# Patient Record
Sex: MALE | Race: WHITE | URBAN - METROPOLITAN AREA
[De-identification: names, ages, dates, MRNs, and addresses within clinical notes are randomized per-mention and may not be internally consistent; named-entity substitution may affect disease eponyms.]

---

## 2018-12-19 RX ORDER — LISINOPRIL 20 MG/1
20 TABLET ORAL DAILY
COMMUNITY

## 2018-12-26 ENCOUNTER — ANESTHESIA EVENT (OUTPATIENT)
Dept: OPERATING ROOM | Age: 78
End: 2018-12-26
Payer: MEDICARE

## 2018-12-26 NOTE — H&P
History and Physical      CHIEF COMPLAINT:  R hand numb    HISTORY OF PRESENT ILLNESS:          Past Medical History:        Diagnosis Date    CAD (coronary artery disease)     Hypertension      Past Surgical History:        Procedure Laterality Date    CAROTID STENT PLACEMENT  2013    CORONARY ARTERY BYPASS GRAFT  2014    x6 bypass dr. Wayne Heading      cyst, benign    PACEMAKER PLACEMENT  08/2018    [facility]     Social History:    TOBACCO:   reports that he has never smoked. He has never used smokeless tobacco.  ETOH:   has no alcohol history on file. DRUGS:   has no drug history on file. Family History:   History reviewed. No pertinent family history. Medications Prior to Admission:  No prescriptions prior to admission. Allergies:  Patient has no known allergies. CONSTITUTIONAL:  negative for  chills and anorexia  HEENT:  negative for  tinnitus  RESPIRATORY:  negative for  dyspnea and cyanosis  CARDIOVASCULAR:  negative for  dyspnea, syncope  GASTROINTESTINAL:  negative for vomiting and odynophagia  GENITOURINARY:  negative for hematuria  ENDOCRINE:  negative for tremor  MUSCULOSKELETAL:  negative for  joint swelling and bone pain  NEUROLOGICAL:  negative for seizures and syncope  BEHAVIOR/PSYCH:  negative for agitated and anxiety    PHYSICAL EXAM:  General appearance:  awake, alert, cooperative, no apparent distress, and appears stated age  Neurologic:  Awake, alert, oriented to name, place and time. Cranial nerves II-XII are grossly intact. Motor is 5 out of 5 bilaterally. Cerebellar finger to nose, heel to shin intact. Sensory is intact.   Babinski down going, Romberg negative, and gait is normal.  Lungs:  No increased work of breathing, good air exchange, clear to auscultation bilaterally, no crackles or wheezing  Heart:  pulses 2 plus all extermities bilaterally  Abdomen:  normal bowel sounds  Skin: warm and dry, no rash or erythema  ENT: tympanic membrane, external ear and ear

## 2018-12-27 ENCOUNTER — HOSPITAL ENCOUNTER (OUTPATIENT)
Age: 78
Setting detail: OUTPATIENT SURGERY
Discharge: HOME | End: 2018-12-27
Attending: ORTHOPAEDIC SURGERY | Admitting: ORTHOPAEDIC SURGERY
Payer: MEDICARE

## 2018-12-27 ENCOUNTER — ANESTHESIA (OUTPATIENT)
Dept: OPERATING ROOM | Age: 78
End: 2018-12-27
Payer: MEDICARE

## 2018-12-27 VITALS
OXYGEN SATURATION: 97 % | WEIGHT: 208 LBS | BODY MASS INDEX: 29.12 KG/M2 | RESPIRATION RATE: 14 BRPM | SYSTOLIC BLOOD PRESSURE: 110 MMHG | TEMPERATURE: 97 F | HEART RATE: 62 BPM | HEIGHT: 71 IN | DIASTOLIC BLOOD PRESSURE: 88 MMHG

## 2018-12-27 VITALS
SYSTOLIC BLOOD PRESSURE: 114 MMHG | DIASTOLIC BLOOD PRESSURE: 75 MMHG | OXYGEN SATURATION: 97 % | RESPIRATION RATE: 17 BRPM

## 2018-12-27 DIAGNOSIS — G56.01 CARPAL TUNNEL SYNDROME OF RIGHT WRIST: Primary | ICD-10-CM

## 2018-12-27 PROCEDURE — 3700000001 HC ADD 15 MINUTES (ANESTHESIA): Performed by: ORTHOPAEDIC SURGERY

## 2018-12-27 PROCEDURE — 3700000000 HC ANESTHESIA ATTENDED CARE: Performed by: ORTHOPAEDIC SURGERY

## 2018-12-27 PROCEDURE — 3600000012 HC SURGERY LEVEL 2 ADDTL 15MIN: Performed by: ORTHOPAEDIC SURGERY

## 2018-12-27 PROCEDURE — 3600000002 HC SURGERY LEVEL 2 BASE: Performed by: ORTHOPAEDIC SURGERY

## 2018-12-27 PROCEDURE — 6360000002 HC RX W HCPCS: Performed by: NURSE ANESTHETIST, CERTIFIED REGISTERED

## 2018-12-27 PROCEDURE — 2709999900 HC NON-CHARGEABLE SUPPLY: Performed by: ORTHOPAEDIC SURGERY

## 2018-12-27 PROCEDURE — 2580000003 HC RX 258: Performed by: ANESTHESIOLOGY

## 2018-12-27 PROCEDURE — 2500000003 HC RX 250 WO HCPCS: Performed by: NURSE ANESTHETIST, CERTIFIED REGISTERED

## 2018-12-27 PROCEDURE — 7100000010 HC PHASE II RECOVERY - FIRST 15 MIN: Performed by: ORTHOPAEDIC SURGERY

## 2018-12-27 PROCEDURE — 7100000011 HC PHASE II RECOVERY - ADDTL 15 MIN: Performed by: ORTHOPAEDIC SURGERY

## 2018-12-27 RX ORDER — HYDRALAZINE HYDROCHLORIDE 20 MG/ML
5 INJECTION INTRAMUSCULAR; INTRAVENOUS EVERY 10 MIN PRN
Status: DISCONTINUED | OUTPATIENT
Start: 2018-12-27 | End: 2018-12-27 | Stop reason: HOSPADM

## 2018-12-27 RX ORDER — LABETALOL HYDROCHLORIDE 5 MG/ML
5 INJECTION, SOLUTION INTRAVENOUS EVERY 10 MIN PRN
Status: DISCONTINUED | OUTPATIENT
Start: 2018-12-27 | End: 2018-12-27 | Stop reason: HOSPADM

## 2018-12-27 RX ORDER — HYDROCODONE BITARTRATE AND ACETAMINOPHEN 5; 325 MG/1; MG/1
1 TABLET ORAL PRN
Status: DISCONTINUED | OUTPATIENT
Start: 2018-12-27 | End: 2018-12-27 | Stop reason: HOSPADM

## 2018-12-27 RX ORDER — PROPOFOL 10 MG/ML
INJECTION, EMULSION INTRAVENOUS PRN
Status: DISCONTINUED | OUTPATIENT
Start: 2018-12-27 | End: 2018-12-27 | Stop reason: SDUPTHER

## 2018-12-27 RX ORDER — ACETAMINOPHEN AND CODEINE PHOSPHATE 300; 30 MG/1; MG/1
1 TABLET ORAL EVERY 4 HOURS PRN
Qty: 30 TABLET | Refills: 0 | Status: SHIPPED | OUTPATIENT
Start: 2018-12-27 | End: 2019-01-06

## 2018-12-27 RX ORDER — FENTANYL CITRATE 50 UG/ML
50 INJECTION, SOLUTION INTRAMUSCULAR; INTRAVENOUS EVERY 5 MIN PRN
Status: DISCONTINUED | OUTPATIENT
Start: 2018-12-27 | End: 2018-12-27 | Stop reason: HOSPADM

## 2018-12-27 RX ORDER — FENTANYL CITRATE 50 UG/ML
INJECTION, SOLUTION INTRAMUSCULAR; INTRAVENOUS PRN
Status: DISCONTINUED | OUTPATIENT
Start: 2018-12-27 | End: 2018-12-27 | Stop reason: SDUPTHER

## 2018-12-27 RX ORDER — LIDOCAINE HYDROCHLORIDE 5 MG/ML
INJECTION, SOLUTION INFILTRATION; INTRAVENOUS PRN
Status: DISCONTINUED | OUTPATIENT
Start: 2018-12-27 | End: 2018-12-27 | Stop reason: SDUPTHER

## 2018-12-27 RX ORDER — DIPHENHYDRAMINE HYDROCHLORIDE 50 MG/ML
12.5 INJECTION INTRAMUSCULAR; INTRAVENOUS
Status: DISCONTINUED | OUTPATIENT
Start: 2018-12-27 | End: 2018-12-27 | Stop reason: HOSPADM

## 2018-12-27 RX ORDER — TRAMADOL HYDROCHLORIDE 50 MG/1
50 TABLET ORAL EVERY 6 HOURS PRN
Status: DISCONTINUED | OUTPATIENT
Start: 2018-12-27 | End: 2018-12-27 | Stop reason: HOSPADM

## 2018-12-27 RX ORDER — SODIUM CHLORIDE, SODIUM LACTATE, POTASSIUM CHLORIDE, CALCIUM CHLORIDE 600; 310; 30; 20 MG/100ML; MG/100ML; MG/100ML; MG/100ML
INJECTION, SOLUTION INTRAVENOUS CONTINUOUS
Status: DISCONTINUED | OUTPATIENT
Start: 2018-12-27 | End: 2018-12-27 | Stop reason: HOSPADM

## 2018-12-27 RX ORDER — SODIUM CHLORIDE 9 MG/ML
INJECTION, SOLUTION INTRAVENOUS CONTINUOUS PRN
Status: DISCONTINUED | OUTPATIENT
Start: 2018-12-27 | End: 2018-12-27 | Stop reason: SDUPTHER

## 2018-12-27 RX ORDER — PROMETHAZINE HYDROCHLORIDE 25 MG/ML
25 INJECTION, SOLUTION INTRAMUSCULAR; INTRAVENOUS
Status: DISCONTINUED | OUTPATIENT
Start: 2018-12-27 | End: 2018-12-27 | Stop reason: HOSPADM

## 2018-12-27 RX ORDER — MIDAZOLAM HYDROCHLORIDE 1 MG/ML
INJECTION INTRAMUSCULAR; INTRAVENOUS PRN
Status: DISCONTINUED | OUTPATIENT
Start: 2018-12-27 | End: 2018-12-27 | Stop reason: SDUPTHER

## 2018-12-27 RX ORDER — PROPOFOL 10 MG/ML
INJECTION, EMULSION INTRAVENOUS CONTINUOUS PRN
Status: DISCONTINUED | OUTPATIENT
Start: 2018-12-27 | End: 2018-12-27 | Stop reason: SDUPTHER

## 2018-12-27 RX ORDER — MORPHINE SULFATE 2 MG/ML
1 INJECTION, SOLUTION INTRAMUSCULAR; INTRAVENOUS EVERY 5 MIN PRN
Status: DISCONTINUED | OUTPATIENT
Start: 2018-12-27 | End: 2018-12-27 | Stop reason: HOSPADM

## 2018-12-27 RX ORDER — HYDROCODONE BITARTRATE AND ACETAMINOPHEN 5; 325 MG/1; MG/1
2 TABLET ORAL PRN
Status: DISCONTINUED | OUTPATIENT
Start: 2018-12-27 | End: 2018-12-27 | Stop reason: HOSPADM

## 2018-12-27 RX ORDER — MEPERIDINE HYDROCHLORIDE 25 MG/ML
12.5 INJECTION INTRAMUSCULAR; INTRAVENOUS; SUBCUTANEOUS EVERY 5 MIN PRN
Status: DISCONTINUED | OUTPATIENT
Start: 2018-12-27 | End: 2018-12-27 | Stop reason: HOSPADM

## 2018-12-27 RX ADMIN — MIDAZOLAM 1 MG: 1 INJECTION INTRAMUSCULAR; INTRAVENOUS at 12:09

## 2018-12-27 RX ADMIN — SODIUM CHLORIDE, POTASSIUM CHLORIDE, SODIUM LACTATE AND CALCIUM CHLORIDE: 600; 310; 30; 20 INJECTION, SOLUTION INTRAVENOUS at 11:45

## 2018-12-27 RX ADMIN — FENTANYL CITRATE 50 MCG: 50 INJECTION, SOLUTION INTRAMUSCULAR; INTRAVENOUS at 12:09

## 2018-12-27 RX ADMIN — SODIUM CHLORIDE: 9 INJECTION, SOLUTION INTRAVENOUS at 12:03

## 2018-12-27 RX ADMIN — PROPOFOL 50 MG: 10 INJECTION, EMULSION INTRAVENOUS at 12:09

## 2018-12-27 RX ADMIN — LIDOCAINE HYDROCHLORIDE 50 ML: 5 INJECTION, SOLUTION INFILTRATION; INTRAVENOUS at 12:03

## 2018-12-27 RX ADMIN — PROPOFOL 100 MCG/KG/MIN: 10 INJECTION, EMULSION INTRAVENOUS at 12:09

## 2018-12-27 RX ADMIN — PROPOFOL 50 MG: 10 INJECTION, EMULSION INTRAVENOUS at 12:12

## 2018-12-27 ASSESSMENT — PULMONARY FUNCTION TESTS
PIF_VALUE: 0

## 2018-12-27 ASSESSMENT — PAIN SCALES - GENERAL
PAINLEVEL_OUTOF10: 0
PAINLEVEL_OUTOF10: 0

## 2018-12-27 ASSESSMENT — PAIN - FUNCTIONAL ASSESSMENT: PAIN_FUNCTIONAL_ASSESSMENT: 0-10

## 2018-12-27 ASSESSMENT — PAIN DESCRIPTION - DESCRIPTORS: DESCRIPTORS: NUMBNESS

## 2018-12-27 ASSESSMENT — LIFESTYLE VARIABLES: SMOKING_STATUS: 0

## 2019-03-11 ENCOUNTER — OFFICE VISIT (OUTPATIENT)
Dept: ORTHOPEDIC SURGERY | Age: 79
End: 2019-03-11

## 2019-03-11 VITALS — WEIGHT: 210 LBS | HEIGHT: 72 IN | BODY MASS INDEX: 28.44 KG/M2

## 2019-03-11 DIAGNOSIS — Z98.890 S/P CARPAL TUNNEL RELEASE: Primary | ICD-10-CM

## 2019-03-11 PROCEDURE — 99024 POSTOP FOLLOW-UP VISIT: CPT | Performed by: ORTHOPAEDIC SURGERY

## 2019-04-01 ENCOUNTER — OFFICE VISIT (OUTPATIENT)
Dept: ORTHOPEDIC SURGERY | Age: 79
End: 2019-04-01
Payer: MEDICARE

## 2019-04-01 VITALS — WEIGHT: 210 LBS | BODY MASS INDEX: 29.4 KG/M2 | HEIGHT: 71 IN

## 2019-04-01 DIAGNOSIS — G56.01 CARPAL TUNNEL SYNDROME OF RIGHT WRIST: Primary | ICD-10-CM

## 2019-04-01 PROCEDURE — 99212 OFFICE O/P EST SF 10 MIN: CPT | Performed by: ORTHOPAEDIC SURGERY

## 2019-04-01 RX ORDER — PRAVASTATIN SODIUM 40 MG
TABLET ORAL
COMMUNITY
Start: 2019-03-28

## 2019-04-01 NOTE — PROGRESS NOTES
Chief Complaint:   Chief Complaint   Patient presents with    Follow-up     Follow up from right CTS DOS 12/27/18. Patient is having numbness in all his fingers. He does not do well with the cold weather. Caron Kuhn  is in for follow-up of his right carpal tunnel decompression. This was done 12/27 2018. He has noticed a great deal of change in the right hand over the longer term here. He still has some tingling in his distal palm and not a lot of change in the sensation in the fingers. The left hand is not as bad as the right one but he does have some tingling and some numbness in that. He does recall the results of the electrodiagnostic testing which showed that he had no response in her right hand but still had response in the left      Allergies; medications; past medical, surgical, family, and social history; and problem list have been reviewed today and updated as indicated in this encounter seen below. Exam: His skin looks good and his scar is well-healed. He has very little evidence of his surgery in the right hand. He does have a slightly more thenar atrophy in the right hand than in the left. He also has some hypertrophic changes in the thumb MP and CMC joints from degeneration. He has good functional range of motion in all of his fingers. ASSESSMENT:    Lindsey Forbes was seen today for follow-up. Diagnoses and all orders for this visit:    Carpal tunnel syndrome of right wrist    I discussed the significance of the numbers and results of the electrodiagnostic testing. We're hopeful that some tingling in his distal palm is an indication that his right median nerve is recovering even though it may be slow. Overall the prognosis for recovery of his left hand if he decides to have that one decompress should be better as indicated by the electrodiagnostic results. PLAN: Follow up as needed.  If the left hand gets worse or he wants to try to prevent it from becoming as bad as the right we could decompress his left median nerve    No follow-ups on file. Current Outpatient Medications   Medication Sig Dispense Refill    pravastatin (PRAVACHOL) 40 MG tablet       metoprolol tartrate (LOPRESSOR) 25 MG tablet Take 25 mg by mouth 2 times daily      lisinopril (PRINIVIL;ZESTRIL) 20 MG tablet Take 20 mg by mouth daily      aspirin 81 MG tablet Take 81 mg by mouth daily       No current facility-administered medications for this visit.         Patient Active Problem List   Diagnosis    Carpal tunnel syndrome of right wrist       Past Medical History:   Diagnosis Date    CAD (coronary artery disease)     Hypertension        Past Surgical History:   Procedure Laterality Date    CAROTID STENT PLACEMENT  2013    CARPAL TUNNEL RELEASE Right 12/27/2018    CARPAL TUNNEL RELEASE Right 12/27/2018    RELEASE TRANSVERSE CARPAL LIGAMENT RIGHT/MEDIAN NERVE EPINEUROTOMY performed by David Phillip DO at [place] [address] GRAFT  2014    x6 bypass dr. Nay Peacock      cyst, benign    PACEMAKER PLACEMENT  08/2018    [facility]       No Known Allergies    Social History     Socioeconomic History    Marital status:      Spouse name: None    Number of children: None    Years of education: None    Highest education level: None   Occupational History    None   Social Needs    Financial resource strain: None    Food insecurity:     Worry: None     Inability: None    Transportation needs:     Medical: None     Non-medical: None   Tobacco Use    Smoking status: Never Smoker    Smokeless tobacco: Never Used   Substance and Sexual Activity    Alcohol use: Yes     Comment: beer    Drug use: Never    Sexual activity: Not Currently   Lifestyle    Physical activity:     Days per week: None     Minutes per session: None    Stress: None   Relationships    Social connections:     Talks on phone: None     Gets together: None     Attends [employer] service: None     Active member of club or organization: None     Attends meetings of clubs or organizations: None     Relationship status: None    Intimate partner violence:     Fear of current or ex partner: None     Emotionally abused: None     Physically abused: None     Forced sexual activity: None   Other Topics Concern    None   Social History Narrative    None       Review of Systems  As follows except as previously noted in HPI:  Constitutional: Negative for chills, diaphoresis, fatigue, fever and unexpected weight change. Respiratory: Negative for cough, shortness of breath and wheezing. Cardiovascular: Negative for chest pain and palpitations. Neurological: Negative for dizziness, syncope, cephalgia. GI / : negative  Musculoskeletal: see HPI       Objective:   Physical Exam   Constitutional: Oriented to person, place, and time. and appears well-developed and well-nourished. :   Head: Normocephalic and atraumatic. Eyes: EOM are normal.   Neck: Neck supple. Cardiovascular: Normal rate and regular rhythm. Pulmonary/Chest: Effort normal. No stridor. No respiratory distress, no wheezes. Abdominal:  No abnormal distension. Neurological: Alert and oriented to person, place, and time. Skin: Skin is warm and dry. Psychiatric: Normal mood and affect.  Behavior is normal. Thought content normal.    ALTHEA Brooks DO    4/1/19  10:14 AM

## 2019-06-24 ENCOUNTER — OFFICE VISIT (OUTPATIENT)
Dept: ORTHOPEDIC SURGERY | Age: 79
End: 2019-06-24
Payer: MEDICARE

## 2019-06-24 VITALS — WEIGHT: 210 LBS | BODY MASS INDEX: 29.4 KG/M2 | HEIGHT: 71 IN

## 2019-06-24 DIAGNOSIS — M25.512 CHRONIC LEFT SHOULDER PAIN: Primary | ICD-10-CM

## 2019-06-24 DIAGNOSIS — G89.29 CHRONIC LEFT SHOULDER PAIN: Primary | ICD-10-CM

## 2019-06-24 PROCEDURE — 99213 OFFICE O/P EST LOW 20 MIN: CPT | Performed by: ORTHOPAEDIC SURGERY

## 2019-06-24 NOTE — PROGRESS NOTES
Harriet Turner is a 66 y.o. male, who presents   Chief Complaint   Patient presents with    Pain     existing patient new problem of left shoulder pain, he has had this pain for months, he has tried ice, he denies any PT, injections or films. HPI[de-identified] Left shoulder pain is been present for some time. It was present prior to doing an excavation job around his foundation for drainage problems. Indicates the pain in the back of the shoulder around the shoulder blade area. There is been no history of specific injury to it. He has continued to be active and seems that his primary goal was relief in his simple fashion as possible. There is no history of numbness in the left shoulder. He is left-hand-dominant. Allergies; medications; past medical, surgical, family, and social history; and problem list have been reviewed today and updated as indicated in this encounter - see below following Ortho specifics. Musculoskeletal: Skin condition gross neurovascular function is good in both upper extremities. There were compared for motion and stability. The left arm towards the shoulder has less mass than the nondominant right arm. There is minimal tenderness to palpation in the anterior shoulder area. There is only slight discomfort around the scapula around the scapular spine. The right shoulder has normal muscle mass motion and function. On the left there is no scapular winging. Rotator cuff strength seems fair to good. Effort was little questionable during physical examination and strength testing. There is little decrease in range of motion of the left shoulder particularly in elevation and external rotation. There was distinct crepitus in the subacromial arch area. This was palpable and audible. There was no gross acromioclavicular or glenohumeral instability. Radiologic Studies: Imaging and 2 views of the left shoulder showed narrowing of the acromioclavicular joint.   The subacromial space was somewhat narrowed with a slightly high riding humeral head within the glenoid. There was some irregularity of the humeral head indicating degenerative arthrosis in the glenohumeral joint. There is also a fairly large anterior inferior acromial osteophyte. ASSESSMENT:  Carlos Humphries was seen today for pain. Diagnoses and all orders for this visit:    Chronic left shoulder pain  -     XR SHOULDER LEFT (MIN 2 VIEWS); Future     Treatment alternatives were reviewed including medical and physical therapies, injections, and surgical options, expected risks benefits and likely outcome of each were discussed in detail, questions asked and answered and understood. Treatment options were discussed including oral anti-inflammatory medications, physical therapy, and injection. I told him I would not prescribe narcotic analgesics for him. PLAN:  also keep it simple with over-the-counter oral anti-inflammatories in the form of ibuprofen. If he continues to have problems or worsens further evaluation would be indicated. There may be some difficulty with this and that he has a pacemaker in the left anterior shoulder area.         Patient Active Problem List   Diagnosis    Carpal tunnel syndrome of right wrist       Past Medical History:   Diagnosis Date    CAD (coronary artery disease)     Hypertension        Past Surgical History:   Procedure Laterality Date    CAROTID STENT PLACEMENT  2013    CARPAL TUNNEL RELEASE Right 12/27/2018    CARPAL TUNNEL RELEASE Right 12/27/2018    RELEASE TRANSVERSE CARPAL LIGAMENT RIGHT/MEDIAN NERVE EPINEUROTOMY performed by Kaye Medina DO at [address]  2014    x6 bypass dr. Rebeka Lewis      cyst, benign    PACEMAKER PLACEMENT  08/2018    [facility]       Current Outpatient Medications   Medication Sig Dispense Refill    pravastatin (PRAVACHOL) 40 MG tablet       metoprolol tartrate (LOPRESSOR) 25 MG tablet Take 25 mg by mouth 2 well-developed and well-nourished. :   Head: Normocephalic and atraumatic. Neck: Neck supple. Eyes: EOM are normal.   Pulmonary/Chest: Effort normal.  No respiratory distress, no wheezes. Neurological: Alert and oriented to person  Skin: Skin is warm and dry. Mert Zavaleta DO    6/24/19  11:34 AM    All reasonable efforts have been made to minimize the risk of errors that may occur in the use of voice recognition and other electronic means of charting.

## 2019-10-16 ENCOUNTER — OFFICE VISIT (OUTPATIENT)
Dept: ORTHOPEDIC SURGERY | Age: 79
End: 2019-10-16
Payer: MEDICARE

## 2019-10-16 VITALS — HEIGHT: 71 IN | BODY MASS INDEX: 28.7 KG/M2 | WEIGHT: 205 LBS

## 2019-10-16 DIAGNOSIS — S46.911A STRAIN OF RIGHT SHOULDER, INITIAL ENCOUNTER: ICD-10-CM

## 2019-10-16 DIAGNOSIS — M19.011 ARTHRITIS OF RIGHT ACROMIOCLAVICULAR JOINT: ICD-10-CM

## 2019-10-16 DIAGNOSIS — M19.011 ARTHRITIS OF RIGHT SHOULDER REGION: ICD-10-CM

## 2019-10-16 DIAGNOSIS — M25.511 ACUTE PAIN OF RIGHT SHOULDER: Primary | ICD-10-CM

## 2019-10-16 PROCEDURE — 99213 OFFICE O/P EST LOW 20 MIN: CPT | Performed by: ORTHOPAEDIC SURGERY

## 2024-01-01 ENCOUNTER — ANESTHESIA (OUTPATIENT)
Dept: OPERATING ROOM | Age: 84
DRG: 421 | End: 2024-01-01
Payer: MEDICARE

## 2024-01-01 ENCOUNTER — APPOINTMENT (OUTPATIENT)
Dept: GENERAL RADIOLOGY | Age: 84
DRG: 421 | End: 2024-01-01
Attending: INTERNAL MEDICINE
Payer: MEDICARE

## 2024-01-01 ENCOUNTER — APPOINTMENT (OUTPATIENT)
Dept: CT IMAGING | Age: 84
DRG: 421 | End: 2024-01-01
Attending: INTERNAL MEDICINE
Payer: MEDICARE

## 2024-01-01 ENCOUNTER — HOSPITAL ENCOUNTER (INPATIENT)
Age: 84
LOS: 8 days | DRG: 421 | End: 2024-12-24
Attending: INTERNAL MEDICINE | Admitting: INTERNAL MEDICINE
Payer: MEDICARE

## 2024-01-01 ENCOUNTER — ANESTHESIA EVENT (OUTPATIENT)
Dept: OPERATING ROOM | Age: 84
DRG: 421 | End: 2024-01-01
Payer: MEDICARE

## 2024-01-01 VITALS — WEIGHT: 160 LBS | TEMPERATURE: 97.9 F | BODY MASS INDEX: 22.4 KG/M2 | HEIGHT: 71 IN

## 2024-01-01 DIAGNOSIS — K86.89 PANCREATIC MASS: ICD-10-CM

## 2024-01-01 DIAGNOSIS — K80.20 SYMPTOMATIC CHOLELITHIASIS: Primary | ICD-10-CM

## 2024-01-01 LAB
ALBUMIN SERPL-MCNC: 2.4 G/DL (ref 3.5–5.2)
ALBUMIN SERPL-MCNC: 2.5 G/DL (ref 3.5–5.2)
ALBUMIN SERPL-MCNC: 2.5 G/DL (ref 3.5–5.2)
ALBUMIN SERPL-MCNC: 2.6 G/DL (ref 3.5–5.2)
ALBUMIN SERPL-MCNC: 2.6 G/DL (ref 3.5–5.2)
ALP SERPL-CCNC: 298 U/L (ref 40–129)
ALP SERPL-CCNC: 299 U/L (ref 40–129)
ALP SERPL-CCNC: 312 U/L (ref 40–129)
ALP SERPL-CCNC: 312 U/L (ref 40–129)
ALP SERPL-CCNC: 314 U/L (ref 40–129)
ALP SERPL-CCNC: 323 U/L (ref 40–129)
ALP SERPL-CCNC: 330 U/L (ref 40–129)
ALP SERPL-CCNC: 355 U/L (ref 40–129)
ALT SERPL-CCNC: 37 U/L (ref 0–40)
ALT SERPL-CCNC: 39 U/L (ref 0–40)
ALT SERPL-CCNC: 41 U/L (ref 0–40)
ALT SERPL-CCNC: 46 U/L (ref 0–40)
ALT SERPL-CCNC: 46 U/L (ref 0–40)
ALT SERPL-CCNC: 47 U/L (ref 0–40)
ALT SERPL-CCNC: 48 U/L (ref 0–40)
ALT SERPL-CCNC: 56 U/L (ref 0–40)
ANION GAP SERPL CALCULATED.3IONS-SCNC: 10 MMOL/L (ref 7–16)
ANION GAP SERPL CALCULATED.3IONS-SCNC: 10 MMOL/L (ref 7–16)
ANION GAP SERPL CALCULATED.3IONS-SCNC: 11 MMOL/L (ref 7–16)
ANION GAP SERPL CALCULATED.3IONS-SCNC: 11 MMOL/L (ref 7–16)
ANION GAP SERPL CALCULATED.3IONS-SCNC: 12 MMOL/L (ref 7–16)
ANION GAP SERPL CALCULATED.3IONS-SCNC: 12 MMOL/L (ref 7–16)
ANION GAP SERPL CALCULATED.3IONS-SCNC: 9 MMOL/L (ref 7–16)
ANION GAP SERPL CALCULATED.3IONS-SCNC: 9 MMOL/L (ref 7–16)
AST SERPL-CCNC: 54 U/L (ref 0–39)
AST SERPL-CCNC: 57 U/L (ref 0–39)
AST SERPL-CCNC: 58 U/L (ref 0–39)
AST SERPL-CCNC: 61 U/L (ref 0–39)
AST SERPL-CCNC: 63 U/L (ref 0–39)
AST SERPL-CCNC: 67 U/L (ref 0–39)
AST SERPL-CCNC: 67 U/L (ref 0–39)
AST SERPL-CCNC: 87 U/L (ref 0–39)
BACTERIA URNS QL MICRO: ABNORMAL
BASOPHILS # BLD: 0 K/UL (ref 0–0.2)
BASOPHILS # BLD: 0.07 K/UL (ref 0–0.2)
BASOPHILS # BLD: 0.08 K/UL (ref 0–0.2)
BASOPHILS # BLD: 0.09 K/UL (ref 0–0.2)
BASOPHILS NFR BLD: 0 % (ref 0–2)
BASOPHILS NFR BLD: 1 % (ref 0–2)
BILIRUB DIRECT SERPL-MCNC: 18.2 MG/DL (ref 0–0.3)
BILIRUB INDIRECT SERPL-MCNC: 6.3 MG/DL (ref 0–1)
BILIRUB SERPL-MCNC: 22.9 MG/DL (ref 0–1.2)
BILIRUB SERPL-MCNC: 24.4 MG/DL (ref 0–1.2)
BILIRUB SERPL-MCNC: 24.5 MG/DL (ref 0–1.2)
BILIRUB SERPL-MCNC: 26.1 MG/DL (ref 0–1.2)
BILIRUB SERPL-MCNC: 28 MG/DL (ref 0–1.2)
BILIRUB SERPL-MCNC: 28.2 MG/DL (ref 0–1.2)
BILIRUB SERPL-MCNC: 28.8 MG/DL (ref 0–1.2)
BILIRUB SERPL-MCNC: 29.1 MG/DL (ref 0–1.2)
BILIRUB SERPL-MCNC: 29.7 MG/DL (ref 0–1.2)
BILIRUB UR QL STRIP: ABNORMAL
BUN SERPL-MCNC: 22 MG/DL (ref 6–23)
BUN SERPL-MCNC: 23 MG/DL (ref 6–23)
BUN SERPL-MCNC: 23 MG/DL (ref 6–23)
BUN SERPL-MCNC: 26 MG/DL (ref 6–23)
BUN SERPL-MCNC: 43 MG/DL (ref 6–23)
BUN SERPL-MCNC: 47 MG/DL (ref 6–23)
CALCIUM SERPL-MCNC: 8.9 MG/DL (ref 8.6–10.2)
CALCIUM SERPL-MCNC: 9 MG/DL (ref 8.6–10.2)
CALCIUM SERPL-MCNC: 9.1 MG/DL (ref 8.6–10.2)
CALCIUM SERPL-MCNC: 9.1 MG/DL (ref 8.6–10.2)
CALCIUM SERPL-MCNC: 9.3 MG/DL (ref 8.6–10.2)
CANCER AG19-9 SERPL IA-ACNC: 202 U/ML (ref 0–35)
CASTS #/AREA URNS LPF: ABNORMAL /LPF
CEA SERPL-MCNC: 74.3 NG/ML (ref 0–5.2)
CHLORIDE SERPL-SCNC: 107 MMOL/L (ref 98–107)
CHLORIDE SERPL-SCNC: 107 MMOL/L (ref 98–107)
CHLORIDE SERPL-SCNC: 108 MMOL/L (ref 98–107)
CHLORIDE SERPL-SCNC: 109 MMOL/L (ref 98–107)
CLARITY UR: ABNORMAL
CO2 SERPL-SCNC: 20 MMOL/L (ref 22–29)
CO2 SERPL-SCNC: 21 MMOL/L (ref 22–29)
CO2 SERPL-SCNC: 21 MMOL/L (ref 22–29)
CO2 SERPL-SCNC: 22 MMOL/L (ref 22–29)
CO2 SERPL-SCNC: 22 MMOL/L (ref 22–29)
COLOR UR: ABNORMAL
CREAT SERPL-MCNC: 1 MG/DL (ref 0.7–1.2)
CREAT SERPL-MCNC: 1 MG/DL (ref 0.7–1.2)
CREAT SERPL-MCNC: 1.1 MG/DL (ref 0.7–1.2)
CREAT SERPL-MCNC: 1.3 MG/DL (ref 0.7–1.2)
CREAT SERPL-MCNC: 1.6 MG/DL (ref 0.7–1.2)
CREAT SERPL-MCNC: 1.8 MG/DL (ref 0.7–1.2)
DATE, STOOL #1: NORMAL
EKG ATRIAL RATE: 60 BPM
EKG ATRIAL RATE: 60 BPM
EKG ATRIAL RATE: 62 BPM
EKG ATRIAL RATE: 66 BPM
EKG ATRIAL RATE: 69 BPM
EKG P AXIS: 90 DEGREES
EKG P-R INTERVAL: 130 MS
EKG P-R INTERVAL: 142 MS
EKG P-R INTERVAL: 144 MS
EKG P-R INTERVAL: 144 MS
EKG P-R INTERVAL: 146 MS
EKG Q-T INTERVAL: 498 MS
EKG Q-T INTERVAL: 504 MS
EKG Q-T INTERVAL: 508 MS
EKG Q-T INTERVAL: 512 MS
EKG Q-T INTERVAL: 514 MS
EKG QRS DURATION: 168 MS
EKG QRS DURATION: 168 MS
EKG QRS DURATION: 170 MS
EKG QRS DURATION: 170 MS
EKG QRS DURATION: 172 MS
EKG QTC CALCULATION (BAZETT): 511 MS
EKG QTC CALCULATION (BAZETT): 512 MS
EKG QTC CALCULATION (BAZETT): 514 MS
EKG QTC CALCULATION (BAZETT): 532 MS
EKG QTC CALCULATION (BAZETT): 533 MS
EKG R AXIS: -118 DEGREES
EKG R AXIS: -123 DEGREES
EKG R AXIS: -131 DEGREES
EKG R AXIS: -131 DEGREES
EKG R AXIS: -133 DEGREES
EKG T AXIS: 48 DEGREES
EKG T AXIS: 54 DEGREES
EKG T AXIS: 65 DEGREES
EKG T AXIS: 69 DEGREES
EKG T AXIS: 69 DEGREES
EKG VENTRICULAR RATE: 60 BPM
EKG VENTRICULAR RATE: 60 BPM
EKG VENTRICULAR RATE: 62 BPM
EKG VENTRICULAR RATE: 66 BPM
EKG VENTRICULAR RATE: 69 BPM
EOSINOPHIL # BLD: 0 K/UL (ref 0.05–0.5)
EOSINOPHIL # BLD: 0 K/UL (ref 0.05–0.5)
EOSINOPHIL # BLD: 0.15 K/UL (ref 0.05–0.5)
EOSINOPHIL # BLD: 0.17 K/UL (ref 0.05–0.5)
EOSINOPHIL # BLD: 0.21 K/UL (ref 0.05–0.5)
EOSINOPHIL # BLD: 0.38 K/UL (ref 0.05–0.5)
EOSINOPHIL # BLD: 0.43 K/UL (ref 0.05–0.5)
EOSINOPHILS RELATIVE PERCENT: 0 % (ref 0–6)
EOSINOPHILS RELATIVE PERCENT: 0 % (ref 0–6)
EOSINOPHILS RELATIVE PERCENT: 2 % (ref 0–6)
EOSINOPHILS RELATIVE PERCENT: 2 % (ref 0–6)
EOSINOPHILS RELATIVE PERCENT: 3 % (ref 0–6)
EOSINOPHILS RELATIVE PERCENT: 4 % (ref 0–6)
EOSINOPHILS RELATIVE PERCENT: 4 % (ref 0–6)
ERYTHROCYTE [DISTWIDTH] IN BLOOD BY AUTOMATED COUNT: 21.4 % (ref 11.5–15)
ERYTHROCYTE [DISTWIDTH] IN BLOOD BY AUTOMATED COUNT: 21.5 % (ref 11.5–15)
ERYTHROCYTE [DISTWIDTH] IN BLOOD BY AUTOMATED COUNT: 21.6 % (ref 11.5–15)
ERYTHROCYTE [DISTWIDTH] IN BLOOD BY AUTOMATED COUNT: 21.6 % (ref 11.5–15)
ERYTHROCYTE [DISTWIDTH] IN BLOOD BY AUTOMATED COUNT: 21.7 % (ref 11.5–15)
ERYTHROCYTE [DISTWIDTH] IN BLOOD BY AUTOMATED COUNT: 21.7 % (ref 11.5–15)
ERYTHROCYTE [DISTWIDTH] IN BLOOD BY AUTOMATED COUNT: 21.8 % (ref 11.5–15)
ERYTHROCYTE [DISTWIDTH] IN BLOOD BY AUTOMATED COUNT: 21.8 % (ref 11.5–15)
FERRITIN SERPL-MCNC: 1375 NG/ML
FOLATE SERPL-MCNC: 8.6 NG/ML (ref 4.8–24.2)
GFR, ESTIMATED: 37 ML/MIN/1.73M2
GFR, ESTIMATED: 44 ML/MIN/1.73M2
GFR, ESTIMATED: 53 ML/MIN/1.73M2
GFR, ESTIMATED: 66 ML/MIN/1.73M2
GFR, ESTIMATED: 66 ML/MIN/1.73M2
GFR, ESTIMATED: 68 ML/MIN/1.73M2
GFR, ESTIMATED: 75 ML/MIN/1.73M2
GFR, ESTIMATED: 75 ML/MIN/1.73M2
GLUCOSE SERPL-MCNC: 105 MG/DL (ref 74–99)
GLUCOSE SERPL-MCNC: 105 MG/DL (ref 74–99)
GLUCOSE SERPL-MCNC: 110 MG/DL (ref 74–99)
GLUCOSE SERPL-MCNC: 136 MG/DL (ref 74–99)
GLUCOSE SERPL-MCNC: 75 MG/DL (ref 74–99)
GLUCOSE SERPL-MCNC: 84 MG/DL (ref 74–99)
GLUCOSE SERPL-MCNC: 89 MG/DL (ref 74–99)
GLUCOSE SERPL-MCNC: 96 MG/DL (ref 74–99)
GLUCOSE UR STRIP-MCNC: 100 MG/DL
HCT VFR BLD AUTO: 23.9 % (ref 37–54)
HCT VFR BLD AUTO: 25.8 % (ref 37–54)
HCT VFR BLD AUTO: 28.8 % (ref 37–54)
HCT VFR BLD AUTO: 30.3 % (ref 37–54)
HCT VFR BLD AUTO: 30.4 % (ref 37–54)
HCT VFR BLD AUTO: 30.8 % (ref 37–54)
HCT VFR BLD AUTO: 31 % (ref 37–54)
HCT VFR BLD AUTO: 31.4 % (ref 37–54)
HCT VFR BLD AUTO: 31.7 % (ref 37–54)
HCT VFR BLD AUTO: 32 % (ref 37–54)
HCT VFR BLD AUTO: 32.6 % (ref 37–54)
HCT VFR BLD AUTO: 33.4 % (ref 37–54)
HCT VFR BLD AUTO: 33.9 % (ref 37–54)
HCT VFR BLD AUTO: 34 % (ref 37–54)
HCT VFR BLD AUTO: 35 % (ref 37–54)
HCT VFR BLD AUTO: 35 % (ref 37–54)
HEMOCCULT SP1 STL QL: NEGATIVE
HGB BLD-MCNC: 10.2 G/DL (ref 12.5–16.5)
HGB BLD-MCNC: 10.7 G/DL (ref 12.5–16.5)
HGB BLD-MCNC: 10.8 G/DL (ref 12.5–16.5)
HGB BLD-MCNC: 10.9 G/DL (ref 12.5–16.5)
HGB BLD-MCNC: 10.9 G/DL (ref 12.5–16.5)
HGB BLD-MCNC: 11.2 G/DL (ref 12.5–16.5)
HGB BLD-MCNC: 11.3 G/DL (ref 12.5–16.5)
HGB BLD-MCNC: 11.4 G/DL (ref 12.5–16.5)
HGB BLD-MCNC: 11.5 G/DL (ref 12.5–16.5)
HGB BLD-MCNC: 11.6 G/DL (ref 12.5–16.5)
HGB BLD-MCNC: 11.8 G/DL (ref 12.5–16.5)
HGB BLD-MCNC: 12.1 G/DL (ref 12.5–16.5)
HGB BLD-MCNC: 12.1 G/DL (ref 12.5–16.5)
HGB BLD-MCNC: 12.4 G/DL (ref 12.5–16.5)
HGB BLD-MCNC: 8.6 G/DL (ref 12.5–16.5)
HGB BLD-MCNC: 9.1 G/DL (ref 12.5–16.5)
HGB UR QL STRIP.AUTO: ABNORMAL
INR PPP: 1.1
INR PPP: 1.2
INR PPP: 3.1
INR PPP: 6.9
IRON SATN MFR SERPL: 51 % (ref 20–55)
IRON SERPL-MCNC: 79 UG/DL (ref 59–158)
KETONES UR STRIP-MCNC: NEGATIVE MG/DL
LEUKOCYTE ESTERASE UR QL STRIP: NEGATIVE
LYMPHOCYTES NFR BLD: 0.14 K/UL (ref 1.5–4)
LYMPHOCYTES NFR BLD: 0.25 K/UL (ref 1.5–4)
LYMPHOCYTES NFR BLD: 0.27 K/UL (ref 1.5–4)
LYMPHOCYTES NFR BLD: 0.3 K/UL (ref 1.5–4)
LYMPHOCYTES NFR BLD: 0.38 K/UL (ref 1.5–4)
LYMPHOCYTES NFR BLD: 0.53 K/UL (ref 1.5–4)
LYMPHOCYTES NFR BLD: 0.68 K/UL (ref 1.5–4)
LYMPHOCYTES RELATIVE PERCENT: 2 % (ref 20–42)
LYMPHOCYTES RELATIVE PERCENT: 2 % (ref 20–42)
LYMPHOCYTES RELATIVE PERCENT: 3 % (ref 20–42)
LYMPHOCYTES RELATIVE PERCENT: 4 % (ref 20–42)
LYMPHOCYTES RELATIVE PERCENT: 4 % (ref 20–42)
LYMPHOCYTES RELATIVE PERCENT: 6 % (ref 20–42)
LYMPHOCYTES RELATIVE PERCENT: 7 % (ref 20–42)
MAGNESIUM SERPL-MCNC: 2 MG/DL (ref 1.6–2.6)
MAGNESIUM SERPL-MCNC: 2.1 MG/DL (ref 1.6–2.6)
MAGNESIUM SERPL-MCNC: 2.2 MG/DL (ref 1.6–2.6)
MAGNESIUM SERPL-MCNC: 2.9 MG/DL (ref 1.6–2.6)
MAGNESIUM SERPL-MCNC: 3.3 MG/DL (ref 1.6–2.6)
MCH RBC QN AUTO: 28.9 PG (ref 26–35)
MCH RBC QN AUTO: 29 PG (ref 26–35)
MCH RBC QN AUTO: 29.5 PG (ref 26–35)
MCH RBC QN AUTO: 29.5 PG (ref 26–35)
MCH RBC QN AUTO: 29.7 PG (ref 26–35)
MCH RBC QN AUTO: 29.8 PG (ref 26–35)
MCH RBC QN AUTO: 30.1 PG (ref 26–35)
MCH RBC QN AUTO: 30.4 PG (ref 26–35)
MCHC RBC AUTO-ENTMCNC: 34.8 G/DL (ref 32–34.5)
MCHC RBC AUTO-ENTMCNC: 35 G/DL (ref 32–34.5)
MCHC RBC AUTO-ENTMCNC: 35.3 G/DL (ref 32–34.5)
MCHC RBC AUTO-ENTMCNC: 35.4 G/DL (ref 32–34.5)
MCHC RBC AUTO-ENTMCNC: 35.6 G/DL (ref 32–34.5)
MCHC RBC AUTO-ENTMCNC: 35.9 G/DL (ref 32–34.5)
MCHC RBC AUTO-ENTMCNC: 36 G/DL (ref 32–34.5)
MCHC RBC AUTO-ENTMCNC: 36 G/DL (ref 32–34.5)
MCV RBC AUTO: 81.5 FL (ref 80–99.9)
MCV RBC AUTO: 82.1 FL (ref 80–99.9)
MCV RBC AUTO: 82.8 FL (ref 80–99.9)
MCV RBC AUTO: 82.9 FL (ref 80–99.9)
MCV RBC AUTO: 84.2 FL (ref 80–99.9)
MCV RBC AUTO: 84.2 FL (ref 80–99.9)
MCV RBC AUTO: 84.5 FL (ref 80–99.9)
MCV RBC AUTO: 85.4 FL (ref 80–99.9)
METAMYELOCYTES ABSOLUTE COUNT: 0.08 K/UL (ref 0–0.12)
METAMYELOCYTES ABSOLUTE COUNT: 0.14 K/UL (ref 0–0.12)
METAMYELOCYTES: 1 % (ref 0–1)
METAMYELOCYTES: 2 % (ref 0–1)
MICROORGANISM SPEC CULT: ABNORMAL
MONOCYTES NFR BLD: 0.09 K/UL (ref 0.1–0.95)
MONOCYTES NFR BLD: 0.36 K/UL (ref 0.1–0.95)
MONOCYTES NFR BLD: 0.51 K/UL (ref 0.1–0.95)
MONOCYTES NFR BLD: 0.6 K/UL (ref 0.1–0.95)
MONOCYTES NFR BLD: 0.61 K/UL (ref 0.1–0.95)
MONOCYTES NFR BLD: 0.75 K/UL (ref 0.1–0.95)
MONOCYTES NFR BLD: 0.83 K/UL (ref 0.1–0.95)
MONOCYTES NFR BLD: 1 % (ref 2–12)
MONOCYTES NFR BLD: 10 % (ref 2–12)
MONOCYTES NFR BLD: 4 % (ref 2–12)
MONOCYTES NFR BLD: 4 % (ref 2–12)
MONOCYTES NFR BLD: 5 % (ref 2–12)
MONOCYTES NFR BLD: 6 % (ref 2–12)
MONOCYTES NFR BLD: 8 % (ref 2–12)
MYELOCYTES ABSOLUTE COUNT: 0.07 K/UL
MYELOCYTES ABSOLUTE COUNT: 0.08 K/UL
MYELOCYTES: 1 %
MYELOCYTES: 1 %
NEUTROPHILS NFR BLD: 81 % (ref 43–80)
NEUTROPHILS NFR BLD: 83 % (ref 43–80)
NEUTROPHILS NFR BLD: 87 % (ref 43–80)
NEUTROPHILS NFR BLD: 90 % (ref 43–80)
NEUTROPHILS NFR BLD: 90 % (ref 43–80)
NEUTROPHILS NFR BLD: 91 % (ref 43–80)
NEUTROPHILS NFR BLD: 94 % (ref 43–80)
NEUTS SEG NFR BLD: 16.26 K/UL (ref 1.8–7.3)
NEUTS SEG NFR BLD: 6.78 K/UL (ref 1.8–7.3)
NEUTS SEG NFR BLD: 7.04 K/UL (ref 1.8–7.3)
NEUTS SEG NFR BLD: 7.34 K/UL (ref 1.8–7.3)
NEUTS SEG NFR BLD: 8.1 K/UL (ref 1.8–7.3)
NEUTS SEG NFR BLD: 8.69 K/UL (ref 1.8–7.3)
NEUTS SEG NFR BLD: 9.76 K/UL (ref 1.8–7.3)
NITRITE UR QL STRIP: POSITIVE
NON-GYN CYTOLOGY REPORT: NORMAL
PARTIAL THROMBOPLASTIN TIME: 109.2 SEC (ref 24.5–35.1)
PARTIAL THROMBOPLASTIN TIME: 113.8 SEC (ref 24.5–35.1)
PARTIAL THROMBOPLASTIN TIME: 128.8 SEC (ref 24.5–35.1)
PARTIAL THROMBOPLASTIN TIME: 147.5 SEC (ref 24.5–35.1)
PARTIAL THROMBOPLASTIN TIME: 32.6 SEC (ref 24.5–35.1)
PARTIAL THROMBOPLASTIN TIME: 63.3 SEC (ref 24.5–35.1)
PARTIAL THROMBOPLASTIN TIME: 88.3 SEC (ref 24.5–35.1)
PARTIAL THROMBOPLASTIN TIME: 89.1 SEC (ref 24.5–35.1)
PARTIAL THROMBOPLASTIN TIME: 97.5 SEC (ref 24.5–35.1)
PH UR STRIP: 5.5 [PH] (ref 5–9)
PHOSPHATE SERPL-MCNC: 2 MG/DL (ref 2.5–4.5)
PHOSPHATE SERPL-MCNC: 2.2 MG/DL (ref 2.5–4.5)
PHOSPHATE SERPL-MCNC: 2.5 MG/DL (ref 2.5–4.5)
PHOSPHATE SERPL-MCNC: 2.6 MG/DL (ref 2.5–4.5)
PHOSPHATE SERPL-MCNC: 2.7 MG/DL (ref 2.5–4.5)
PHOSPHATE SERPL-MCNC: 2.8 MG/DL (ref 2.5–4.5)
PHOSPHATE SERPL-MCNC: 3 MG/DL (ref 2.5–4.5)
PHOSPHATE SERPL-MCNC: 3.7 MG/DL (ref 2.5–4.5)
PLATELET # BLD AUTO: 230 K/UL (ref 130–450)
PLATELET # BLD AUTO: 230 K/UL (ref 130–450)
PLATELET # BLD AUTO: 233 K/UL (ref 130–450)
PLATELET # BLD AUTO: 239 K/UL (ref 130–450)
PLATELET # BLD AUTO: 245 K/UL (ref 130–450)
PLATELET # BLD AUTO: 250 K/UL (ref 130–450)
PLATELET # BLD AUTO: 262 K/UL (ref 130–450)
PLATELET # BLD AUTO: 264 K/UL (ref 130–450)
PLATELET # BLD AUTO: 265 K/UL (ref 130–450)
PMV BLD AUTO: 10 FL (ref 7–12)
PMV BLD AUTO: 10.1 FL (ref 7–12)
PMV BLD AUTO: 10.1 FL (ref 7–12)
PMV BLD AUTO: 10.2 FL (ref 7–12)
PMV BLD AUTO: 10.3 FL (ref 7–12)
PMV BLD AUTO: 10.8 FL (ref 7–12)
PMV BLD AUTO: 9.8 FL (ref 7–12)
PMV BLD AUTO: 9.9 FL (ref 7–12)
POTASSIUM SERPL-SCNC: 3.4 MMOL/L (ref 3.5–5)
POTASSIUM SERPL-SCNC: 3.5 MMOL/L (ref 3.5–5)
POTASSIUM SERPL-SCNC: 3.6 MMOL/L (ref 3.5–5)
POTASSIUM SERPL-SCNC: 3.6 MMOL/L (ref 3.5–5)
POTASSIUM SERPL-SCNC: 3.7 MMOL/L (ref 3.5–5)
POTASSIUM SERPL-SCNC: 3.7 MMOL/L (ref 3.5–5)
POTASSIUM SERPL-SCNC: 3.8 MMOL/L (ref 3.5–5)
POTASSIUM SERPL-SCNC: 3.8 MMOL/L (ref 3.5–5)
POTASSIUM SERPL-SCNC: 3.9 MMOL/L (ref 3.5–5)
PROT SERPL-MCNC: 4.3 G/DL (ref 6.4–8.3)
PROT SERPL-MCNC: 4.3 G/DL (ref 6.4–8.3)
PROT SERPL-MCNC: 4.4 G/DL (ref 6.4–8.3)
PROT SERPL-MCNC: 4.5 G/DL (ref 6.4–8.3)
PROT SERPL-MCNC: 4.5 G/DL (ref 6.4–8.3)
PROT SERPL-MCNC: 4.6 G/DL (ref 6.4–8.3)
PROT UR STRIP-MCNC: 30 MG/DL
PROTHROMBIN TIME: 11.4 SEC (ref 9.3–12.4)
PROTHROMBIN TIME: 12.7 SEC (ref 9.3–12.4)
PROTHROMBIN TIME: 34.4 SEC (ref 9.3–12.4)
PROTHROMBIN TIME: 76.1 SEC (ref 9.3–12.4)
RBC # BLD AUTO: 2.83 M/UL (ref 3.8–5.8)
RBC # BLD AUTO: 3.02 M/UL (ref 3.8–5.8)
RBC # BLD AUTO: 3.42 M/UL (ref 3.8–5.8)
RBC # BLD AUTO: 3.67 M/UL (ref 3.8–5.8)
RBC # BLD AUTO: 3.74 M/UL (ref 3.8–5.8)
RBC # BLD AUTO: 3.8 M/UL (ref 3.8–5.8)
RBC # BLD AUTO: 3.86 M/UL (ref 3.8–5.8)
RBC # BLD AUTO: 4.17 M/UL (ref 3.8–5.8)
RBC # BLD: ABNORMAL 10*6/UL
RBC #/AREA URNS HPF: ABNORMAL /HPF
SERVICE CMNT-IMP: ABNORMAL
SODIUM SERPL-SCNC: 137 MMOL/L (ref 132–146)
SODIUM SERPL-SCNC: 138 MMOL/L (ref 132–146)
SODIUM SERPL-SCNC: 139 MMOL/L (ref 132–146)
SODIUM SERPL-SCNC: 140 MMOL/L (ref 132–146)
SODIUM SERPL-SCNC: 140 MMOL/L (ref 132–146)
SODIUM SERPL-SCNC: 141 MMOL/L (ref 132–146)
SP GR UR STRIP: 1.02 (ref 1–1.03)
SPECIMEN DESCRIPTION: ABNORMAL
TIBC SERPL-MCNC: 156 UG/DL (ref 250–450)
TIME, STOOL #1: 2232
UROBILINOGEN UR STRIP-ACNC: 1 EU/DL (ref 0–1)
VIT B12 SERPL-MCNC: 754 PG/ML (ref 211–946)
WBC # BLD: ABNORMAL 10*3/UL
WBC #/AREA URNS HPF: ABNORMAL /HPF
WBC OTHER # BLD: 10.7 K/UL (ref 4.5–11.5)
WBC OTHER # BLD: 17.3 K/UL (ref 4.5–11.5)
WBC OTHER # BLD: 7.8 K/UL (ref 4.5–11.5)
WBC OTHER # BLD: 8.2 K/UL (ref 4.5–11.5)
WBC OTHER # BLD: 8.7 K/UL (ref 4.5–11.5)
WBC OTHER # BLD: 9.2 K/UL (ref 4.5–11.5)
WBC OTHER # BLD: 9.7 K/UL (ref 4.5–11.5)
WBC OTHER # BLD: 9.8 K/UL (ref 4.5–11.5)

## 2024-01-01 PROCEDURE — 0F798DZ DILATION OF COMMON BILE DUCT WITH INTRALUMINAL DEVICE, VIA NATURAL OR ARTIFICIAL OPENING ENDOSCOPIC: ICD-10-PCS | Performed by: INTERNAL MEDICINE

## 2024-01-01 PROCEDURE — 2580000003 HC RX 258: Performed by: STUDENT IN AN ORGANIZED HEALTH CARE EDUCATION/TRAINING PROGRAM

## 2024-01-01 PROCEDURE — 2500000003 HC RX 250 WO HCPCS: Performed by: STUDENT IN AN ORGANIZED HEALTH CARE EDUCATION/TRAINING PROGRAM

## 2024-01-01 PROCEDURE — 85018 HEMOGLOBIN: CPT

## 2024-01-01 PROCEDURE — 72125 CT NECK SPINE W/O DYE: CPT

## 2024-01-01 PROCEDURE — 7100000010 HC PHASE II RECOVERY - FIRST 15 MIN: Performed by: SURGERY

## 2024-01-01 PROCEDURE — 6360000002 HC RX W HCPCS: Performed by: HOSPITALIST

## 2024-01-01 PROCEDURE — 6360000002 HC RX W HCPCS: Performed by: STUDENT IN AN ORGANIZED HEALTH CARE EDUCATION/TRAINING PROGRAM

## 2024-01-01 PROCEDURE — 6370000000 HC RX 637 (ALT 250 FOR IP): Performed by: STUDENT IN AN ORGANIZED HEALTH CARE EDUCATION/TRAINING PROGRAM

## 2024-01-01 PROCEDURE — 85014 HEMATOCRIT: CPT

## 2024-01-01 PROCEDURE — 99232 SBSQ HOSP IP/OBS MODERATE 35: CPT | Performed by: TRANSPLANT SURGERY

## 2024-01-01 PROCEDURE — 85730 THROMBOPLASTIN TIME PARTIAL: CPT

## 2024-01-01 PROCEDURE — 83735 ASSAY OF MAGNESIUM: CPT

## 2024-01-01 PROCEDURE — 6370000000 HC RX 637 (ALT 250 FOR IP): Performed by: INTERNAL MEDICINE

## 2024-01-01 PROCEDURE — 3700000001 HC ADD 15 MINUTES (ANESTHESIA): Performed by: SURGERY

## 2024-01-01 PROCEDURE — 88307 TISSUE EXAM BY PATHOLOGIST: CPT

## 2024-01-01 PROCEDURE — 6360000004 HC RX CONTRAST MEDICATION: Performed by: RADIOLOGY

## 2024-01-01 PROCEDURE — 6370000000 HC RX 637 (ALT 250 FOR IP): Performed by: SPECIALIST

## 2024-01-01 PROCEDURE — 2580000003 HC RX 258: Performed by: HOSPITALIST

## 2024-01-01 PROCEDURE — 7100000011 HC PHASE II RECOVERY - ADDTL 15 MIN: Performed by: SURGERY

## 2024-01-01 PROCEDURE — 93005 ELECTROCARDIOGRAM TRACING: CPT | Performed by: STUDENT IN AN ORGANIZED HEALTH CARE EDUCATION/TRAINING PROGRAM

## 2024-01-01 PROCEDURE — 85025 COMPLETE CBC W/AUTO DIFF WBC: CPT

## 2024-01-01 PROCEDURE — 85610 PROTHROMBIN TIME: CPT

## 2024-01-01 PROCEDURE — 88173 CYTOPATH EVAL FNA REPORT: CPT

## 2024-01-01 PROCEDURE — 36415 COLL VENOUS BLD VENIPUNCTURE: CPT

## 2024-01-01 PROCEDURE — 87086 URINE CULTURE/COLONY COUNT: CPT

## 2024-01-01 PROCEDURE — 2500000003 HC RX 250 WO HCPCS: Performed by: HOSPITALIST

## 2024-01-01 PROCEDURE — C1753 CATH, INTRAVAS ULTRASOUND: HCPCS | Performed by: SURGERY

## 2024-01-01 PROCEDURE — 84100 ASSAY OF PHOSPHORUS: CPT

## 2024-01-01 PROCEDURE — C1874 STENT, COATED/COV W/DEL SYS: HCPCS | Performed by: SURGERY

## 2024-01-01 PROCEDURE — 86301 IMMUNOASSAY TUMOR CA 19-9: CPT

## 2024-01-01 PROCEDURE — 93005 ELECTROCARDIOGRAM TRACING: CPT | Performed by: SPECIALIST

## 2024-01-01 PROCEDURE — 0FBG8ZX EXCISION OF PANCREAS, VIA NATURAL OR ARTIFICIAL OPENING ENDOSCOPIC, DIAGNOSTIC: ICD-10-PCS | Performed by: INTERNAL MEDICINE

## 2024-01-01 PROCEDURE — 80053 COMPREHEN METABOLIC PANEL: CPT

## 2024-01-01 PROCEDURE — 93010 ELECTROCARDIOGRAM REPORT: CPT | Performed by: INTERNAL MEDICINE

## 2024-01-01 PROCEDURE — 0FB04ZX EXCISION OF LIVER, PERCUTANEOUS ENDOSCOPIC APPROACH, DIAGNOSTIC: ICD-10-PCS | Performed by: INTERNAL MEDICINE

## 2024-01-01 PROCEDURE — 2580000003 HC RX 258: Performed by: INTERNAL MEDICINE

## 2024-01-01 PROCEDURE — 97165 OT EVAL LOW COMPLEX 30 MIN: CPT | Performed by: OCCUPATIONAL THERAPIST

## 2024-01-01 PROCEDURE — 6360000002 HC RX W HCPCS: Performed by: INTERNAL MEDICINE

## 2024-01-01 PROCEDURE — 82378 CARCINOEMBRYONIC ANTIGEN: CPT

## 2024-01-01 PROCEDURE — 43274 ERCP DUCT STENT PLACEMENT: CPT | Performed by: SURGERY

## 2024-01-01 PROCEDURE — 97530 THERAPEUTIC ACTIVITIES: CPT | Performed by: OCCUPATIONAL THERAPIST

## 2024-01-01 PROCEDURE — C1769 GUIDE WIRE: HCPCS | Performed by: SURGERY

## 2024-01-01 PROCEDURE — 82728 ASSAY OF FERRITIN: CPT

## 2024-01-01 PROCEDURE — 99222 1ST HOSP IP/OBS MODERATE 55: CPT | Performed by: STUDENT IN AN ORGANIZED HEALTH CARE EDUCATION/TRAINING PROGRAM

## 2024-01-01 PROCEDURE — 82272 OCCULT BLD FECES 1-3 TESTS: CPT

## 2024-01-01 PROCEDURE — 74177 CT ABD & PELVIS W/CONTRAST: CPT

## 2024-01-01 PROCEDURE — 83550 IRON BINDING TEST: CPT

## 2024-01-01 PROCEDURE — 99232 SBSQ HOSP IP/OBS MODERATE 35: CPT | Performed by: SURGERY

## 2024-01-01 PROCEDURE — 81001 URINALYSIS AUTO W/SCOPE: CPT

## 2024-01-01 PROCEDURE — 6370000000 HC RX 637 (ALT 250 FOR IP): Performed by: TRANSPLANT SURGERY

## 2024-01-01 PROCEDURE — 1200000000 HC SEMI PRIVATE

## 2024-01-01 PROCEDURE — 97530 THERAPEUTIC ACTIVITIES: CPT | Performed by: PHYSICAL THERAPIST

## 2024-01-01 PROCEDURE — 84132 ASSAY OF SERUM POTASSIUM: CPT

## 2024-01-01 PROCEDURE — 0FBG4ZX EXCISION OF PANCREAS, PERCUTANEOUS ENDOSCOPIC APPROACH, DIAGNOSTIC: ICD-10-PCS | Performed by: INTERNAL MEDICINE

## 2024-01-01 PROCEDURE — 6370000000 HC RX 637 (ALT 250 FOR IP): Performed by: SURGERY

## 2024-01-01 PROCEDURE — 82607 VITAMIN B-12: CPT

## 2024-01-01 PROCEDURE — 85027 COMPLETE CBC AUTOMATED: CPT

## 2024-01-01 PROCEDURE — 3700000000 HC ANESTHESIA ATTENDED CARE: Performed by: SURGERY

## 2024-01-01 PROCEDURE — 87088 URINE BACTERIA CULTURE: CPT

## 2024-01-01 PROCEDURE — 43264 ERCP REMOVE DUCT CALCULI: CPT | Performed by: SURGERY

## 2024-01-01 PROCEDURE — 0F798DZ DILATION OF COMMON BILE DUCT WITH INTRALUMINAL DEVICE, VIA NATURAL OR ARTIFICIAL OPENING ENDOSCOPIC: ICD-10-PCS | Performed by: SURGERY

## 2024-01-01 PROCEDURE — 2709999900 HC NON-CHARGEABLE SUPPLY: Performed by: SURGERY

## 2024-01-01 PROCEDURE — 2060000000 HC ICU INTERMEDIATE R&B

## 2024-01-01 PROCEDURE — 3600007503: Performed by: SURGERY

## 2024-01-01 PROCEDURE — 83540 ASSAY OF IRON: CPT

## 2024-01-01 PROCEDURE — 3600007513: Performed by: SURGERY

## 2024-01-01 PROCEDURE — 82248 BILIRUBIN DIRECT: CPT

## 2024-01-01 PROCEDURE — 43242 EGD US FINE NEEDLE BX/ASPIR: CPT | Performed by: SURGERY

## 2024-01-01 PROCEDURE — 82746 ASSAY OF FOLIC ACID SERUM: CPT

## 2024-01-01 PROCEDURE — 85049 AUTOMATED PLATELET COUNT: CPT

## 2024-01-01 PROCEDURE — 82270 OCCULT BLOOD FECES: CPT

## 2024-01-01 PROCEDURE — 99233 SBSQ HOSP IP/OBS HIGH 50: CPT | Performed by: TRANSPLANT SURGERY

## 2024-01-01 PROCEDURE — 74330 X-RAY BILE/PANC ENDOSCOPY: CPT

## 2024-01-01 PROCEDURE — 70450 CT HEAD/BRAIN W/O DYE: CPT

## 2024-01-01 PROCEDURE — 2720000010 HC SURG SUPPLY STERILE: Performed by: SURGERY

## 2024-01-01 PROCEDURE — 99222 1ST HOSP IP/OBS MODERATE 55: CPT | Performed by: SURGERY

## 2024-01-01 PROCEDURE — 88305 TISSUE EXAM BY PATHOLOGIST: CPT

## 2024-01-01 PROCEDURE — 97161 PT EVAL LOW COMPLEX 20 MIN: CPT | Performed by: PHYSICAL THERAPIST

## 2024-01-01 PROCEDURE — 99291 CRITICAL CARE FIRST HOUR: CPT | Performed by: INTERNAL MEDICINE

## 2024-01-01 DEVICE — STENT SYSTEM RMV
Type: IMPLANTABLE DEVICE | Site: BILE DUCT | Status: FUNCTIONAL
Brand: WALLFLEX BILIARY

## 2024-01-01 RX ORDER — SODIUM CHLORIDE 9 MG/ML
INJECTION, SOLUTION INTRAVENOUS PRN
Status: DISCONTINUED | OUTPATIENT
Start: 2024-01-01 | End: 2024-01-01 | Stop reason: HOSPADM

## 2024-01-01 RX ORDER — SODIUM CHLORIDE, SODIUM LACTATE, POTASSIUM CHLORIDE, CALCIUM CHLORIDE 600; 310; 30; 20 MG/100ML; MG/100ML; MG/100ML; MG/100ML
INJECTION, SOLUTION INTRAVENOUS CONTINUOUS
Status: DISCONTINUED | OUTPATIENT
Start: 2024-01-01 | End: 2024-01-01 | Stop reason: HOSPADM

## 2024-01-01 RX ORDER — POTASSIUM CHLORIDE 7.45 MG/ML
10 INJECTION INTRAVENOUS PRN
Status: DISCONTINUED | OUTPATIENT
Start: 2024-01-01 | End: 2024-01-01 | Stop reason: HOSPADM

## 2024-01-01 RX ORDER — HEPARIN SODIUM 1000 [USP'U]/ML
2000 INJECTION, SOLUTION INTRAVENOUS; SUBCUTANEOUS PRN
Status: DISCONTINUED | OUTPATIENT
Start: 2024-01-01 | End: 2024-01-01

## 2024-01-01 RX ORDER — SODIUM CHLORIDE 0.9 % (FLUSH) 0.9 %
5-40 SYRINGE (ML) INJECTION EVERY 12 HOURS SCHEDULED
Status: DISCONTINUED | OUTPATIENT
Start: 2024-01-01 | End: 2024-01-01 | Stop reason: HOSPADM

## 2024-01-01 RX ORDER — METOPROLOL TARTRATE 25 MG/1
25 TABLET, FILM COATED ORAL 2 TIMES DAILY
Status: DISCONTINUED | OUTPATIENT
Start: 2024-01-01 | End: 2024-01-01

## 2024-01-01 RX ORDER — HEPARIN SODIUM 10000 [USP'U]/100ML
5-30 INJECTION, SOLUTION INTRAVENOUS CONTINUOUS
Status: DISCONTINUED | OUTPATIENT
Start: 2024-01-01 | End: 2024-01-01

## 2024-01-01 RX ORDER — MAGNESIUM SULFATE IN WATER 40 MG/ML
2000 INJECTION, SOLUTION INTRAVENOUS PRN
Status: DISCONTINUED | OUTPATIENT
Start: 2024-01-01 | End: 2024-01-01 | Stop reason: HOSPADM

## 2024-01-01 RX ORDER — SODIUM CHLORIDE 0.9 % (FLUSH) 0.9 %
5-40 SYRINGE (ML) INJECTION PRN
Status: CANCELLED | OUTPATIENT
Start: 2024-01-01

## 2024-01-01 RX ORDER — HYDROXYZINE HYDROCHLORIDE 25 MG/1
25 TABLET, FILM COATED ORAL EVERY 6 HOURS PRN
Status: DISCONTINUED | OUTPATIENT
Start: 2024-01-01 | End: 2024-01-01 | Stop reason: HOSPADM

## 2024-01-01 RX ORDER — ONDANSETRON 2 MG/ML
4 INJECTION INTRAMUSCULAR; INTRAVENOUS EVERY 6 HOURS PRN
Status: DISCONTINUED | OUTPATIENT
Start: 2024-01-01 | End: 2024-01-01

## 2024-01-01 RX ORDER — MIDODRINE HYDROCHLORIDE 10 MG/1
10 TABLET ORAL ONCE
Status: COMPLETED | OUTPATIENT
Start: 2024-01-01 | End: 2024-01-01

## 2024-01-01 RX ORDER — HEPARIN SODIUM 1000 [USP'U]/ML
40 INJECTION, SOLUTION INTRAVENOUS; SUBCUTANEOUS PRN
Status: DISCONTINUED | OUTPATIENT
Start: 2024-01-01 | End: 2024-01-01 | Stop reason: SDUPTHER

## 2024-01-01 RX ORDER — SODIUM CHLORIDE 9 MG/ML
INJECTION, SOLUTION INTRAVENOUS PRN
Status: CANCELLED | OUTPATIENT
Start: 2024-01-01

## 2024-01-01 RX ORDER — IPRATROPIUM BROMIDE AND ALBUTEROL SULFATE 2.5; .5 MG/3ML; MG/3ML
1 SOLUTION RESPIRATORY (INHALATION)
Status: CANCELLED | OUTPATIENT
Start: 2024-01-01 | End: 2024-01-01

## 2024-01-01 RX ORDER — SODIUM CHLORIDE 0.9 % (FLUSH) 0.9 %
5-40 SYRINGE (ML) INJECTION PRN
Status: DISCONTINUED | OUTPATIENT
Start: 2024-01-01 | End: 2024-01-01 | Stop reason: HOSPADM

## 2024-01-01 RX ORDER — MORPHINE SULFATE 2 MG/ML
2 INJECTION, SOLUTION INTRAMUSCULAR; INTRAVENOUS EVERY 4 HOURS PRN
Status: DISCONTINUED | OUTPATIENT
Start: 2024-01-01 | End: 2024-01-01 | Stop reason: HOSPADM

## 2024-01-01 RX ORDER — HEPARIN SODIUM 10000 [USP'U]/100ML
5-30 INJECTION, SOLUTION INTRAVENOUS CONTINUOUS
Status: DISCONTINUED | OUTPATIENT
Start: 2024-01-01 | End: 2024-01-01 | Stop reason: DRUGHIGH

## 2024-01-01 RX ORDER — ENOXAPARIN SODIUM 100 MG/ML
1 INJECTION SUBCUTANEOUS 2 TIMES DAILY
Status: DISCONTINUED | OUTPATIENT
Start: 2024-01-01 | End: 2024-01-01 | Stop reason: HOSPADM

## 2024-01-01 RX ORDER — DIPHENHYDRAMINE HYDROCHLORIDE 50 MG/ML
50 INJECTION INTRAMUSCULAR; INTRAVENOUS ONCE
Status: COMPLETED | OUTPATIENT
Start: 2024-01-01 | End: 2024-01-01

## 2024-01-01 RX ORDER — SODIUM BICARBONATE 650 MG/1
650 TABLET ORAL 2 TIMES DAILY
Status: DISCONTINUED | OUTPATIENT
Start: 2024-01-01 | End: 2024-01-01 | Stop reason: HOSPADM

## 2024-01-01 RX ORDER — HYDRALAZINE HYDROCHLORIDE 20 MG/ML
10 INJECTION INTRAMUSCULAR; INTRAVENOUS
Status: CANCELLED | OUTPATIENT
Start: 2024-01-01

## 2024-01-01 RX ORDER — PROCHLORPERAZINE EDISYLATE 5 MG/ML
5 INJECTION INTRAMUSCULAR; INTRAVENOUS
Status: CANCELLED | OUTPATIENT
Start: 2024-01-01 | End: 2024-01-01

## 2024-01-01 RX ORDER — POTASSIUM CHLORIDE 1500 MG/1
40 TABLET, EXTENDED RELEASE ORAL PRN
Status: DISCONTINUED | OUTPATIENT
Start: 2024-01-01 | End: 2024-01-01 | Stop reason: HOSPADM

## 2024-01-01 RX ORDER — SODIUM CHLORIDE 0.9 % (FLUSH) 0.9 %
5-40 SYRINGE (ML) INJECTION EVERY 12 HOURS SCHEDULED
Status: CANCELLED | OUTPATIENT
Start: 2024-01-01

## 2024-01-01 RX ORDER — SODIUM CHLORIDE, SODIUM LACTATE, POTASSIUM CHLORIDE, CALCIUM CHLORIDE 600; 310; 30; 20 MG/100ML; MG/100ML; MG/100ML; MG/100ML
INJECTION, SOLUTION INTRAVENOUS CONTINUOUS
Status: ACTIVE | OUTPATIENT
Start: 2024-01-01 | End: 2024-01-01

## 2024-01-01 RX ORDER — HEPARIN SODIUM 1000 [USP'U]/ML
4000 INJECTION, SOLUTION INTRAVENOUS; SUBCUTANEOUS PRN
Status: DISCONTINUED | OUTPATIENT
Start: 2024-01-01 | End: 2024-01-01

## 2024-01-01 RX ORDER — 0.9 % SODIUM CHLORIDE 0.9 %
500 INTRAVENOUS SOLUTION INTRAVENOUS ONCE
Status: COMPLETED | OUTPATIENT
Start: 2024-01-01 | End: 2024-01-01

## 2024-01-01 RX ORDER — HEPARIN SODIUM 1000 [USP'U]/ML
80 INJECTION, SOLUTION INTRAVENOUS; SUBCUTANEOUS ONCE
Status: DISCONTINUED | OUTPATIENT
Start: 2024-01-01 | End: 2024-01-01

## 2024-01-01 RX ORDER — DOFETILIDE 0.12 MG/1
125 CAPSULE ORAL 2 TIMES DAILY
Status: DISCONTINUED | OUTPATIENT
Start: 2024-01-01 | End: 2024-01-01

## 2024-01-01 RX ORDER — HEPARIN SODIUM 1000 [USP'U]/ML
80 INJECTION, SOLUTION INTRAVENOUS; SUBCUTANEOUS PRN
Status: DISCONTINUED | OUTPATIENT
Start: 2024-01-01 | End: 2024-01-01 | Stop reason: SDUPTHER

## 2024-01-01 RX ORDER — IOPAMIDOL 755 MG/ML
75 INJECTION, SOLUTION INTRAVASCULAR
Status: COMPLETED | OUTPATIENT
Start: 2024-01-01 | End: 2024-01-01

## 2024-01-01 RX ORDER — NALOXONE HYDROCHLORIDE 0.4 MG/ML
INJECTION, SOLUTION INTRAMUSCULAR; INTRAVENOUS; SUBCUTANEOUS PRN
Status: CANCELLED | OUTPATIENT
Start: 2024-01-01

## 2024-01-01 RX ORDER — LABETALOL HYDROCHLORIDE 5 MG/ML
10 INJECTION, SOLUTION INTRAVENOUS
Status: CANCELLED | OUTPATIENT
Start: 2024-01-01

## 2024-01-01 RX ORDER — DOFETILIDE 0.5 MG/1
500 CAPSULE ORAL 2 TIMES DAILY
Status: DISCONTINUED | OUTPATIENT
Start: 2024-01-01 | End: 2024-01-01

## 2024-01-01 RX ORDER — KETOROLAC TROMETHAMINE 15 MG/ML
15 INJECTION, SOLUTION INTRAMUSCULAR; INTRAVENOUS ONCE
Status: CANCELLED | OUTPATIENT
Start: 2024-01-01 | End: 2024-01-01

## 2024-01-01 RX ORDER — ONDANSETRON 4 MG/1
4 TABLET, ORALLY DISINTEGRATING ORAL EVERY 8 HOURS PRN
Status: DISCONTINUED | OUTPATIENT
Start: 2024-01-01 | End: 2024-01-01 | Stop reason: HOSPADM

## 2024-01-01 RX ORDER — MORPHINE SULFATE 2 MG/ML
2 INJECTION, SOLUTION INTRAMUSCULAR; INTRAVENOUS ONCE
Status: COMPLETED | OUTPATIENT
Start: 2024-01-01 | End: 2024-01-01

## 2024-01-01 RX ORDER — IOPAMIDOL 755 MG/ML
70 INJECTION, SOLUTION INTRAVASCULAR
Status: COMPLETED | OUTPATIENT
Start: 2024-01-01 | End: 2024-01-01

## 2024-01-01 RX ORDER — METOPROLOL TARTRATE 50 MG
100 TABLET ORAL 2 TIMES DAILY
Status: DISCONTINUED | OUTPATIENT
Start: 2024-01-01 | End: 2024-01-01

## 2024-01-01 RX ORDER — HALOPERIDOL 5 MG/ML
1 INJECTION INTRAMUSCULAR
Status: CANCELLED | OUTPATIENT
Start: 2024-01-01 | End: 2024-01-01

## 2024-01-01 RX ORDER — POLYETHYLENE GLYCOL 3350 17 G/17G
17 POWDER, FOR SOLUTION ORAL DAILY PRN
Status: DISCONTINUED | OUTPATIENT
Start: 2024-01-01 | End: 2024-01-01 | Stop reason: HOSPADM

## 2024-01-01 RX ORDER — MEPERIDINE HYDROCHLORIDE 25 MG/ML
12.5 INJECTION INTRAMUSCULAR; INTRAVENOUS; SUBCUTANEOUS EVERY 5 MIN PRN
Status: CANCELLED | OUTPATIENT
Start: 2024-01-01

## 2024-01-01 RX ORDER — DIPHENHYDRAMINE HYDROCHLORIDE 50 MG/ML
12.5 INJECTION INTRAMUSCULAR; INTRAVENOUS
Status: CANCELLED | OUTPATIENT
Start: 2024-01-01 | End: 2024-01-01

## 2024-01-01 RX ORDER — CHOLESTYRAMINE 4 G/9G
1 POWDER, FOR SUSPENSION ORAL 2 TIMES DAILY
Status: DISCONTINUED | OUTPATIENT
Start: 2024-01-01 | End: 2024-01-01

## 2024-01-01 RX ORDER — ACETAMINOPHEN 650 MG/1
650 SUPPOSITORY RECTAL EVERY 6 HOURS PRN
Status: DISCONTINUED | OUTPATIENT
Start: 2024-01-01 | End: 2024-01-01 | Stop reason: HOSPADM

## 2024-01-01 RX ORDER — DIPHENHYDRAMINE HCL 25 MG
25 TABLET ORAL EVERY 6 HOURS PRN
Status: DISCONTINUED | OUTPATIENT
Start: 2024-01-01 | End: 2024-01-01 | Stop reason: HOSPADM

## 2024-01-01 RX ORDER — HEPARIN SODIUM 200 [USP'U]/100ML
3 INJECTION, SOLUTION INTRAVENOUS CONTINUOUS
Status: DISCONTINUED | OUTPATIENT
Start: 2024-01-01 | End: 2024-01-01 | Stop reason: SDUPTHER

## 2024-01-01 RX ORDER — POTASSIUM CHLORIDE 1500 MG/1
20 TABLET, EXTENDED RELEASE ORAL ONCE
Status: COMPLETED | OUTPATIENT
Start: 2024-01-01 | End: 2024-01-01

## 2024-01-01 RX ORDER — ACETAMINOPHEN 325 MG/1
650 TABLET ORAL EVERY 6 HOURS PRN
Status: DISCONTINUED | OUTPATIENT
Start: 2024-01-01 | End: 2024-01-01 | Stop reason: HOSPADM

## 2024-01-01 RX ADMIN — ENOXAPARIN SODIUM 70 MG: 100 INJECTION SUBCUTANEOUS at 15:55

## 2024-01-01 RX ADMIN — DOFETILIDE 125 MCG: 0.12 CAPSULE ORAL at 09:58

## 2024-01-01 RX ADMIN — CHOLESTYRAMINE 4 G: 4 POWDER, FOR SUSPENSION ORAL at 19:32

## 2024-01-01 RX ADMIN — DOCUSATE SODIUM 100 MG: 50 LIQUID ORAL at 08:53

## 2024-01-01 RX ADMIN — CHOLESTYRAMINE 4 G: 4 POWDER, FOR SUSPENSION ORAL at 10:02

## 2024-01-01 RX ADMIN — SODIUM BICARBONATE 650 MG TABLET 650 MG: at 13:05

## 2024-01-01 RX ADMIN — CHOLESTYRAMINE 4 G: 4 POWDER, FOR SUSPENSION ORAL at 09:23

## 2024-01-01 RX ADMIN — HYDROXYZINE HYDROCHLORIDE 25 MG: 25 TABLET ORAL at 09:58

## 2024-01-01 RX ADMIN — Medication 250 MG: at 19:32

## 2024-01-01 RX ADMIN — SODIUM CHLORIDE, PRESERVATIVE FREE 10 ML: 5 INJECTION INTRAVENOUS at 08:52

## 2024-01-01 RX ADMIN — ONDANSETRON 4 MG: 4 TABLET, ORALLY DISINTEGRATING ORAL at 20:23

## 2024-01-01 RX ADMIN — DOCUSATE SODIUM 100 MG: 50 LIQUID ORAL at 12:44

## 2024-01-01 RX ADMIN — SODIUM CHLORIDE, PRESERVATIVE FREE 40 MG: 5 INJECTION INTRAVENOUS at 05:25

## 2024-01-01 RX ADMIN — CHOLESTYRAMINE 4 G: 4 POWDER, FOR SUSPENSION ORAL at 21:42

## 2024-01-01 RX ADMIN — HYDROXYZINE HYDROCHLORIDE 25 MG: 25 TABLET ORAL at 08:52

## 2024-01-01 RX ADMIN — SODIUM CHLORIDE, PRESERVATIVE FREE 40 MG: 5 INJECTION INTRAVENOUS at 08:53

## 2024-01-01 RX ADMIN — POTASSIUM BICARBONATE 50 MEQ: 978 TABLET, EFFERVESCENT ORAL at 13:25

## 2024-01-01 RX ADMIN — MIDODRINE HYDROCHLORIDE 10 MG: 10 TABLET ORAL at 09:47

## 2024-01-01 RX ADMIN — Medication 250 MG: at 17:41

## 2024-01-01 RX ADMIN — SODIUM CHLORIDE, PRESERVATIVE FREE 40 MG: 5 INJECTION INTRAVENOUS at 16:15

## 2024-01-01 RX ADMIN — DOFETILIDE 125 MCG: 0.12 CAPSULE ORAL at 11:30

## 2024-01-01 RX ADMIN — SODIUM CHLORIDE, PRESERVATIVE FREE 10 ML: 5 INJECTION INTRAVENOUS at 19:33

## 2024-01-01 RX ADMIN — SODIUM CHLORIDE, PRESERVATIVE FREE 10 ML: 5 INJECTION INTRAVENOUS at 20:25

## 2024-01-01 RX ADMIN — METOPROLOL TARTRATE 100 MG: 50 TABLET, FILM COATED ORAL at 09:36

## 2024-01-01 RX ADMIN — METOPROLOL TARTRATE 25 MG: 25 TABLET, FILM COATED ORAL at 20:51

## 2024-01-01 RX ADMIN — DOFETILIDE 125 MCG: 0.12 CAPSULE ORAL at 20:51

## 2024-01-01 RX ADMIN — PHYTONADIONE 5 MG: 10 INJECTION, EMULSION INTRAMUSCULAR; INTRAVENOUS; SUBCUTANEOUS at 07:03

## 2024-01-01 RX ADMIN — SODIUM CHLORIDE, PRESERVATIVE FREE 40 MG: 5 INJECTION INTRAVENOUS at 09:37

## 2024-01-01 RX ADMIN — MORPHINE SULFATE 2 MG: 2 INJECTION, SOLUTION INTRAMUSCULAR; INTRAVENOUS at 22:41

## 2024-01-01 RX ADMIN — DIPHENHYDRAMINE HYDROCHLORIDE 25 MG: 25 TABLET ORAL at 18:44

## 2024-01-01 RX ADMIN — PHYTONADIONE 5 MG: 10 INJECTION, EMULSION INTRAMUSCULAR; INTRAVENOUS; SUBCUTANEOUS at 17:07

## 2024-01-01 RX ADMIN — METOPROLOL TARTRATE 100 MG: 50 TABLET, FILM COATED ORAL at 09:24

## 2024-01-01 RX ADMIN — SODIUM CHLORIDE, PRESERVATIVE FREE 10 ML: 5 INJECTION INTRAVENOUS at 21:15

## 2024-01-01 RX ADMIN — SODIUM CHLORIDE, PRESERVATIVE FREE 40 MG: 5 INJECTION INTRAVENOUS at 19:33

## 2024-01-01 RX ADMIN — DOFETILIDE 125 MCG: 0.12 CAPSULE ORAL at 19:32

## 2024-01-01 RX ADMIN — SODIUM CHLORIDE, PRESERVATIVE FREE 40 MG: 5 INJECTION INTRAVENOUS at 05:12

## 2024-01-01 RX ADMIN — HEPARIN SODIUM AND DEXTROSE 12 UNITS/KG/HR: 10000; 5 INJECTION INTRAVENOUS at 06:28

## 2024-01-01 RX ADMIN — SODIUM CHLORIDE 500 ML: 9 INJECTION, SOLUTION INTRAVENOUS at 08:22

## 2024-01-01 RX ADMIN — CHOLESTYRAMINE 4 G: 4 POWDER, FOR SUSPENSION ORAL at 19:47

## 2024-01-01 RX ADMIN — HEPARIN SODIUM AND DEXTROSE 12 UNITS/KG/HR: 10000; 5 INJECTION INTRAVENOUS at 18:18

## 2024-01-01 RX ADMIN — SODIUM CHLORIDE, PRESERVATIVE FREE 10 ML: 5 INJECTION INTRAVENOUS at 11:40

## 2024-01-01 RX ADMIN — DOFETILIDE 125 MCG: 0.12 CAPSULE ORAL at 19:47

## 2024-01-01 RX ADMIN — METOPROLOL TARTRATE 100 MG: 50 TABLET, FILM COATED ORAL at 19:47

## 2024-01-01 RX ADMIN — CHOLESTYRAMINE 4 G: 4 POWDER, FOR SUSPENSION ORAL at 20:56

## 2024-01-01 RX ADMIN — SODIUM CHLORIDE, PRESERVATIVE FREE 40 MG: 5 INJECTION INTRAVENOUS at 21:00

## 2024-01-01 RX ADMIN — METOPROLOL TARTRATE 100 MG: 50 TABLET, FILM COATED ORAL at 21:42

## 2024-01-01 RX ADMIN — SODIUM CHLORIDE, PRESERVATIVE FREE 40 MG: 5 INJECTION INTRAVENOUS at 13:56

## 2024-01-01 RX ADMIN — HYDROXYZINE HYDROCHLORIDE 25 MG: 25 TABLET ORAL at 18:44

## 2024-01-01 RX ADMIN — WATER 1000 MG: 1 INJECTION INTRAMUSCULAR; INTRAVENOUS; SUBCUTANEOUS at 13:23

## 2024-01-01 RX ADMIN — MORPHINE SULFATE 2 MG: 2 INJECTION, SOLUTION INTRAMUSCULAR; INTRAVENOUS at 04:25

## 2024-01-01 RX ADMIN — ENOXAPARIN SODIUM 70 MG: 100 INJECTION SUBCUTANEOUS at 08:53

## 2024-01-01 RX ADMIN — ENOXAPARIN SODIUM 70 MG: 100 INJECTION SUBCUTANEOUS at 20:50

## 2024-01-01 RX ADMIN — HYDROXYZINE HYDROCHLORIDE 25 MG: 25 TABLET ORAL at 21:19

## 2024-01-01 RX ADMIN — SODIUM CHLORIDE, PRESERVATIVE FREE 40 MG: 5 INJECTION INTRAVENOUS at 21:42

## 2024-01-01 RX ADMIN — SODIUM CHLORIDE, PRESERVATIVE FREE 40 MG: 5 INJECTION INTRAVENOUS at 19:47

## 2024-01-01 RX ADMIN — ENOXAPARIN SODIUM 70 MG: 100 INJECTION SUBCUTANEOUS at 09:16

## 2024-01-01 RX ADMIN — HYDROXYZINE HYDROCHLORIDE 25 MG: 25 TABLET ORAL at 15:45

## 2024-01-01 RX ADMIN — IOPAMIDOL 75 ML: 755 INJECTION, SOLUTION INTRAVENOUS at 08:09

## 2024-01-01 RX ADMIN — SODIUM CHLORIDE 500 ML: 9 INJECTION, SOLUTION INTRAVENOUS at 11:54

## 2024-01-01 RX ADMIN — CHOLESTYRAMINE 4 G: 4 POWDER, FOR SUSPENSION ORAL at 11:38

## 2024-01-01 RX ADMIN — SODIUM CHLORIDE, PRESERVATIVE FREE 40 MG: 5 INJECTION INTRAVENOUS at 15:55

## 2024-01-01 RX ADMIN — SODIUM CHLORIDE, PRESERVATIVE FREE 10 ML: 5 INJECTION INTRAVENOUS at 09:27

## 2024-01-01 RX ADMIN — CHOLESTYRAMINE 4 G: 4 POWDER, FOR SUSPENSION ORAL at 09:36

## 2024-01-01 RX ADMIN — DOFETILIDE 125 MCG: 0.12 CAPSULE ORAL at 09:37

## 2024-01-01 RX ADMIN — IOPAMIDOL 70 ML: 755 INJECTION, SOLUTION INTRAVENOUS at 14:09

## 2024-01-01 RX ADMIN — WATER 1000 MG: 1 INJECTION INTRAMUSCULAR; INTRAVENOUS; SUBCUTANEOUS at 15:53

## 2024-01-01 RX ADMIN — SODIUM CHLORIDE, PRESERVATIVE FREE 10 ML: 5 INJECTION INTRAVENOUS at 08:55

## 2024-01-01 RX ADMIN — SODIUM CHLORIDE, PRESERVATIVE FREE 40 MG: 5 INJECTION INTRAVENOUS at 04:34

## 2024-01-01 RX ADMIN — ENOXAPARIN SODIUM 70 MG: 100 INJECTION SUBCUTANEOUS at 21:02

## 2024-01-01 RX ADMIN — POTASSIUM & SODIUM PHOSPHATES POWDER PACK 280-160-250 MG 500 MG: 280-160-250 PACK at 11:57

## 2024-01-01 RX ADMIN — SODIUM CHLORIDE, PRESERVATIVE FREE 40 MG: 5 INJECTION INTRAVENOUS at 04:10

## 2024-01-01 RX ADMIN — DOFETILIDE 125 MCG: 0.12 CAPSULE ORAL at 08:39

## 2024-01-01 RX ADMIN — SODIUM CHLORIDE, PRESERVATIVE FREE 10 ML: 5 INJECTION INTRAVENOUS at 19:47

## 2024-01-01 RX ADMIN — SODIUM CHLORIDE, PRESERVATIVE FREE 40 MG: 5 INJECTION INTRAVENOUS at 10:00

## 2024-01-01 RX ADMIN — MAJOR MAGNESIUM CITRATE ORAL SOLUTION - LEMON 296 ML: 1.75 LIQUID ORAL at 12:44

## 2024-01-01 RX ADMIN — SODIUM CHLORIDE, PRESERVATIVE FREE 40 MG: 5 INJECTION INTRAVENOUS at 20:50

## 2024-01-01 RX ADMIN — DIPHENHYDRAMINE HYDROCHLORIDE 25 MG: 25 TABLET ORAL at 08:52

## 2024-01-01 RX ADMIN — SODIUM CHLORIDE, PRESERVATIVE FREE 10 ML: 5 INJECTION INTRAVENOUS at 21:01

## 2024-01-01 RX ADMIN — SODIUM CHLORIDE, POTASSIUM CHLORIDE, SODIUM LACTATE AND CALCIUM CHLORIDE: 600; 310; 30; 20 INJECTION, SOLUTION INTRAVENOUS at 11:46

## 2024-01-01 RX ADMIN — HYDROXYZINE HYDROCHLORIDE 25 MG: 25 TABLET ORAL at 08:51

## 2024-01-01 RX ADMIN — SODIUM CHLORIDE, PRESERVATIVE FREE 40 MG: 5 INJECTION INTRAVENOUS at 17:42

## 2024-01-01 RX ADMIN — MORPHINE SULFATE 2 MG: 2 INJECTION, SOLUTION INTRAMUSCULAR; INTRAVENOUS at 15:49

## 2024-01-01 RX ADMIN — DOFETILIDE 125 MCG: 0.12 CAPSULE ORAL at 20:56

## 2024-01-01 RX ADMIN — WATER 1000 MG: 1 INJECTION INTRAMUSCULAR; INTRAVENOUS; SUBCUTANEOUS at 12:44

## 2024-01-01 RX ADMIN — SODIUM CHLORIDE, PRESERVATIVE FREE 40 MG: 5 INJECTION INTRAVENOUS at 11:39

## 2024-01-01 RX ADMIN — ENOXAPARIN SODIUM 70 MG: 100 INJECTION SUBCUTANEOUS at 21:42

## 2024-01-01 RX ADMIN — HEPARIN SODIUM AND DEXTROSE 9 UNITS/KG/HR: 10000; 5 INJECTION INTRAVENOUS at 03:03

## 2024-01-01 RX ADMIN — SODIUM CHLORIDE, PRESERVATIVE FREE 40 MG: 5 INJECTION INTRAVENOUS at 04:22

## 2024-01-01 RX ADMIN — ENOXAPARIN SODIUM 70 MG: 100 INJECTION SUBCUTANEOUS at 09:59

## 2024-01-01 RX ADMIN — METOPROLOL TARTRATE 100 MG: 50 TABLET, FILM COATED ORAL at 19:33

## 2024-01-01 RX ADMIN — HEPARIN SODIUM 2000 UNITS: 1000 INJECTION INTRAVENOUS; SUBCUTANEOUS at 01:31

## 2024-01-01 RX ADMIN — DIPHENHYDRAMINE HYDROCHLORIDE 50 MG: 50 INJECTION INTRAMUSCULAR; INTRAVENOUS at 01:47

## 2024-01-01 RX ADMIN — DOFETILIDE 125 MCG: 0.12 CAPSULE ORAL at 21:42

## 2024-01-01 RX ADMIN — MORPHINE SULFATE 1 MG/HR: 10 INJECTION, SOLUTION INTRAMUSCULAR; INTRAVENOUS at 17:10

## 2024-01-01 RX ADMIN — CHOLESTYRAMINE 4 G: 4 POWDER, FOR SUSPENSION ORAL at 21:21

## 2024-01-01 RX ADMIN — METOPROLOL TARTRATE 25 MG: 25 TABLET, FILM COATED ORAL at 09:58

## 2024-01-01 RX ADMIN — SODIUM CHLORIDE, PRESERVATIVE FREE 10 ML: 5 INJECTION INTRAVENOUS at 21:43

## 2024-01-01 RX ADMIN — SODIUM CHLORIDE, PRESERVATIVE FREE 40 MG: 5 INJECTION INTRAVENOUS at 09:16

## 2024-01-01 RX ADMIN — SODIUM CHLORIDE, PRESERVATIVE FREE 10 ML: 5 INJECTION INTRAVENOUS at 10:03

## 2024-01-01 RX ADMIN — SODIUM CHLORIDE, PRESERVATIVE FREE 40 MG: 5 INJECTION INTRAVENOUS at 15:45

## 2024-01-01 RX ADMIN — SODIUM CHLORIDE, PRESERVATIVE FREE 40 MG: 5 INJECTION INTRAVENOUS at 21:21

## 2024-01-01 RX ADMIN — SODIUM CHLORIDE, PRESERVATIVE FREE 40 MG: 5 INJECTION INTRAVENOUS at 15:56

## 2024-01-01 RX ADMIN — SODIUM CHLORIDE 40 MG: 9 INJECTION INTRAMUSCULAR; INTRAVENOUS; SUBCUTANEOUS at 08:51

## 2024-01-01 RX ADMIN — WATER 1000 MG: 1 INJECTION INTRAMUSCULAR; INTRAVENOUS; SUBCUTANEOUS at 11:50

## 2024-01-01 RX ADMIN — DIPHENHYDRAMINE HYDROCHLORIDE 25 MG: 25 TABLET ORAL at 08:51

## 2024-01-01 RX ADMIN — CHOLESTYRAMINE 4 G: 4 POWDER, FOR SUSPENSION ORAL at 09:19

## 2024-01-01 RX ADMIN — ENOXAPARIN SODIUM 70 MG: 100 INJECTION SUBCUTANEOUS at 20:23

## 2024-01-01 RX ADMIN — POTASSIUM CHLORIDE 20 MEQ: 1500 TABLET, EXTENDED RELEASE ORAL at 09:36

## 2024-01-01 RX ADMIN — SODIUM CHLORIDE, PRESERVATIVE FREE 40 MG: 5 INJECTION INTRAVENOUS at 05:46

## 2024-01-01 RX ADMIN — SODIUM CHLORIDE, PRESERVATIVE FREE 40 MG: 5 INJECTION INTRAVENOUS at 18:30

## 2024-01-01 RX ADMIN — MORPHINE SULFATE 2 MG: 2 INJECTION, SOLUTION INTRAMUSCULAR; INTRAVENOUS at 21:43

## 2024-01-01 ASSESSMENT — PAIN SCALES - GENERAL
PAINLEVEL_OUTOF10: 10
PAINLEVEL_OUTOF10: 2
PAINLEVEL_OUTOF10: 6
PAINLEVEL_OUTOF10: 0

## 2024-01-01 ASSESSMENT — PAIN DESCRIPTION - LOCATION
LOCATION: ABDOMEN

## 2024-01-01 ASSESSMENT — PAIN DESCRIPTION - DESCRIPTORS
DESCRIPTORS: ACHING;DISCOMFORT;PRESSURE
DESCRIPTORS: THROBBING;SQUEEZING;ACHING

## 2024-01-01 ASSESSMENT — PAIN SCALES - WONG BAKER
WONGBAKER_NUMERICALRESPONSE: HURTS A LITTLE BIT
WONGBAKER_NUMERICALRESPONSE: NO HURT

## 2024-09-20 ENCOUNTER — OFFICE VISIT (OUTPATIENT)
Dept: SURGERY | Age: 84
End: 2024-09-20

## 2024-09-20 VITALS
WEIGHT: 188 LBS | DIASTOLIC BLOOD PRESSURE: 86 MMHG | HEIGHT: 71 IN | SYSTOLIC BLOOD PRESSURE: 165 MMHG | TEMPERATURE: 97.5 F | BODY MASS INDEX: 26.32 KG/M2

## 2024-09-20 DIAGNOSIS — C44.310 BCC (BASAL CELL CARCINOMA), FACE: Primary | ICD-10-CM

## 2024-09-20 RX ORDER — FLUOROURACIL 50 MG/G
CREAM TOPICAL 2 TIMES DAILY
COMMUNITY

## 2024-09-20 RX ORDER — SACUBITRIL AND VALSARTAN 49; 51 MG/1; MG/1
1 TABLET, FILM COATED ORAL 2 TIMES DAILY
COMMUNITY

## 2024-09-20 RX ORDER — DOFETILIDE 0.25 MG/1
500 CAPSULE ORAL 2 TIMES DAILY
COMMUNITY

## 2024-09-23 ENCOUNTER — TELEPHONE (OUTPATIENT)
Dept: SURGERY | Age: 84
End: 2024-09-23

## 2024-10-29 NOTE — PROGRESS NOTES
Subjective:    Follow up today for excision of right vertex scalp basal cell carcinoma. Denies fever, nausea, vomiting, leg pain or swelling.  The patient voices understanding of the procedure they are having today and would like to proceed.  Patient states that the mass has been painful and growing.    Objective:    There were no vitals taken for this visit.      right vertex scalp basal cell carcinoma  Lesion- 4mm x 4mm  Margin- 2mm  Defect- 8mm x 8mm  Scar-20mm         Assessment:    Patient Active Problem List   Diagnosis    Carpal tunnel syndrome of right wrist       Plan:       Diagnosis  -) right vertex scalp basal cell carcinoma  -) Pain    -The risks, benefits and options were discussed with the pt. The risks included but not limited to pain, bleeding, infection, heavy scarring, damage to surrounding structures, fluid collections, asymmetry, and need for further procedures. All of His questions were answered to their satisfaction and He agrees to proceed with the procedure.      -After consent was obtained, the area was cleansed with an alcohol swab. Local anesthesia consisting of 1% lidocaine with 1:100,000 epinepherine was injected  surrounding the area. The local was allowed to work.  Using a 10-blade the right vertex scalp basal cell carcinoma  was excised,  intermediate  repair was performed.  The wound(s) were closed with 3-0 vicryl and a running locking 3-0 prolene hemostasis was obtained and a bacitracin and gauze dressing was placed over the wound.  The procedure was performed by Dr Freddy Mills     Please schedule an appointment to be seen on Follow-up with our office in 7-14 days  Diet: regular diet  Activity: no heavy lifting for 7 days.   Shower/Bathing: OK to shower over the wound site in 48 hours.  No baths, hot tubs or soaking of the wound site at this time.  Pt voices understanding.     Wound care:   Bacitracin will need to be placed over the wound site twice daily and covered with a gauze 
ACETAMINOPHEN-CODEINE (TYLENOL/CODEINE #3) 300-30 MG PER TABLET    Take 1 tablet by mouth every 6 hours as needed for Pain for up to 3 days. Intended supply: 3 days. Take lowest dose possible to manage pain    CYCLOBENZAPRINE (FLEXERIL) 10 MG TABLET    Take 1 tablet by mouth 3 times daily as needed for Muscle spasms       Comment: Please note this report has been produced using speech recognition software and may contain errorsrelated to that system including errors in grammar, punctuation, and spelling, as well as words and phrases that may be inappropriate. If there are any questions or concerns please feel free to contact the dictating providerfor clarification.     Shakir Schumacher MD  Attending Emergency Physician              Shakir Schumacher MD  05/27/20 [id number]

## 2024-11-06 ENCOUNTER — PROCEDURE VISIT (OUTPATIENT)
Dept: SURGERY | Age: 84
End: 2024-11-06
Payer: MEDICARE

## 2024-11-06 VITALS
OXYGEN SATURATION: 98 % | HEIGHT: 71 IN | WEIGHT: 180 LBS | TEMPERATURE: 97.7 F | HEART RATE: 70 BPM | SYSTOLIC BLOOD PRESSURE: 108 MMHG | RESPIRATION RATE: 20 BRPM | DIASTOLIC BLOOD PRESSURE: 70 MMHG | BODY MASS INDEX: 25.2 KG/M2

## 2024-11-06 DIAGNOSIS — C44.310 BCC (BASAL CELL CARCINOMA), FACE: Primary | ICD-10-CM

## 2024-11-06 PROCEDURE — NBSRV NON-BILLABLE SERVICE: Performed by: PLASTIC SURGERY

## 2024-11-06 PROCEDURE — 12031 INTMD RPR S/A/T/EXT 2.5 CM/<: CPT | Performed by: PLASTIC SURGERY

## 2024-11-06 PROCEDURE — 11621 EXC S/N/H/F/G MAL+MRG 0.6-1: CPT | Performed by: PLASTIC SURGERY

## 2024-11-06 RX ORDER — LIDOCAINE HYDROCHLORIDE AND EPINEPHRINE 10; 10 MG/ML; UG/ML
10 INJECTION, SOLUTION INFILTRATION; PERINEURAL ONCE
Status: COMPLETED | OUTPATIENT
Start: 2024-11-06 | End: 2024-11-06

## 2024-11-06 RX ADMIN — LIDOCAINE HYDROCHLORIDE AND EPINEPHRINE 10 ML: 10; 10 INJECTION, SOLUTION INFILTRATION; PERINEURAL at 10:53

## 2024-11-12 LAB — SURGICAL PATHOLOGY REPORT: NORMAL

## 2024-11-15 ENCOUNTER — OFFICE VISIT (OUTPATIENT)
Dept: SURGERY | Age: 84
End: 2024-11-15

## 2024-11-15 VITALS
SYSTOLIC BLOOD PRESSURE: 111 MMHG | OXYGEN SATURATION: 97 % | HEART RATE: 80 BPM | TEMPERATURE: 97.9 F | DIASTOLIC BLOOD PRESSURE: 72 MMHG

## 2024-11-15 DIAGNOSIS — C44.310 BCC (BASAL CELL CARCINOMA), FACE: Primary | ICD-10-CM

## 2024-11-15 PROCEDURE — 99024 POSTOP FOLLOW-UP VISIT: CPT | Performed by: PHYSICIAN ASSISTANT

## 2024-11-15 NOTE — PROGRESS NOTES
it so Important to Use Sunscreen on Scars?  A scar is new and more fragile than the surrounding skin.   If you do not use sunscreen, the scar line will react differently to the sun than the surrounding skin.   If you don't use sunscreen, the scar tissue will become darker than surrounding skin. This is a hyper-pigmented scar and will remain darker than the other skin.   After one year, the scar and surrounding skin should react equally to sun.   Superficial Scar Massage  Scar massage desensitizes and reduces scar adhesions so skin glides freely.   Rub in a circular motion on and around the scar with firm, even pressure for 5 minutes four times per day   You can start scar massage once incision is completely healed and strong enough to handle the motion (usually 10 - 14 days post operatively).   You use lotion to do the scar massage to allow ease with motion over the scar and prevent friction at the area.     Patient had no further questions.      F/U PRN  Call office with concerns or signs of infection.    GIAL Dillard   9:26 AM  11/15/2024

## 2024-12-16 PROBLEM — K80.20 SYMPTOMATIC CHOLELITHIASIS: Status: ACTIVE | Noted: 2024-01-01

## 2024-12-17 PROBLEM — K83.1 COMMON BILE DUCT (CBD) OBSTRUCTION: Status: ACTIVE | Noted: 2024-01-01

## 2024-12-17 PROBLEM — E44.0 MODERATE PROTEIN-CALORIE MALNUTRITION (HCC): Chronic | Status: ACTIVE | Noted: 2024-01-01

## 2024-12-17 NOTE — CONSULTS
Consult Note    Reason for Consult:  Preop cardiac evaluation.    Requesting Physician:  Akira Clay MD    HISTORY OF PRESENT ILLNESS:    The patient is a 84 y.o. male who I saw in the past.    He has problem coronary artery disease with angioplasty and stenting done almost 20 years ago.    He ended having CABG surgery 2014.    He underwent dual-chamber permanent pacemaker in 2018 for symptomatic sinus bradycardia with sick sinus syndrome    He has problem with paroxysmal atrial fibrillation.  He underwent cardioversion about 3 years ago.    He was admitted to [facility] a few days ago with severe jaundice.  He was felt to have liver mass with possible metastasis.  He was found to have dilatation of the common bile duct.    He was transferred after that to [facility] for further GI workup.    Cardiac consult was requested    Patient was lying semi Upright in bed when I came to see him this morning.  He looked very weak but no acute distress.  No chest pain.  Appetite is poor.      Past Medical History:   Diagnosis Date    CAD (coronary artery disease)     Hypertension        Past Surgical History:   Procedure Laterality Date    CAROTID STENT PLACEMENT  2013    CARPAL TUNNEL RELEASE Right 12/27/2018    CARPAL TUNNEL RELEASE Right 12/27/2018    RELEASE TRANSVERSE CARPAL LIGAMENT RIGHT/MEDIAN NERVE EPINEUROTOMY performed by ALTHEA Tadeo DO at [facility] OR    CORONARY ARTERY BYPASS GRAFT  2014    x6 bypass dr. olivares     NECK SURGERY      cyst, benign    PACEMAKER PLACEMENT  08/2018    [facility]       Prior to Admission medications    Medication Sig Start Date End Date Taking? Authorizing Provider   apixaban (ELIQUIS) 5 MG TABS tablet Take 1 tablet by mouth 2 times daily   Yes Micheline Palmer MD   dofetilide (TIKOSYN) 250 MCG capsule Take 2 capsules by mouth 2 times daily    ProviderMicheline MD   sacubitril-valsartan (ENTRESTO) 49-51 MG per tablet Take 1 tablet by mouth 2 times daily    Other Topics Concern    Not on file   Social History Narrative    Not on file     Social Determinants of Health     Financial Resource Strain: Low Risk  (11/11/2020)    Received from [facility]    Overall Financial Resource Strain (CARDIA)     Difficulty of Paying Living Expenses: Not hard at all   Food Insecurity: No Food Insecurity (12/17/2024)    Hunger Vital Sign     Worried About Running Out of Food in the Last Year: Never true     Ran Out of Food in the Last Year: Never true   Transportation Needs: No Transportation Needs (12/17/2024)    PRAPARE - Transportation     Lack of Transportation (Medical): No     Lack of Transportation (Non-Medical): No   Physical Activity: Not on file   Stress: Not on file   Social Connections: Not on file   Intimate Partner Violence: Not on file   Housing Stability: Low Risk  (12/17/2024)    Housing Stability Vital Sign     Unable to Pay for Housing in the Last Year: No     Number of Times Moved in the Last Year: 1     Homeless in the Last Year: No       No family history on file.    Review Of Systems:   CONSTITUTIONAL: Generalized body weakness.  No fever or chills. Significant weight loss lately.  HEENT: No nosebleed.  No double vision.  No stridor.  No hoarseness of the voice.  No hearing impairment.  RESPIRATORY: No shortness of breath.  No cough.  CARDIOVASCULAR: No chest pain.  No palpitation.  GASTROINTESTINAL: No abdominal pain.  No nausea.  No vomiting. Severe jaundice.  GENITOURINARY: No dysuria or frequency.  No bladder or kidney tumor.  HEMATOLOGIC/LYMPHATIC: No bleeding disorder.  No adenopathy.  ENDOCRINE: No polyuria or polydipsia.  MUSCULOSKELETAL: Arthritis pain.  NEUROLOGICAL: No history of seizure or stroke.  No syncope.  BEHAVIOR/PSYCH: No depression or suicidal ideation.    Physical Exam:    General Appearance:  Alert, cooperative, no distress, appears stated age  Head:  Normocephalic, without obvious abnormality, atraumatic  HEENT: Fairly unremarkable

## 2024-12-17 NOTE — PROGRESS NOTES
Comprehensive Nutrition Assessment    Type and Reason for Visit:  Initial, Positive nutrition screen (MST 2)    Nutrition Recommendations/Plan:   Continue Current Diet (Regular-Low Fat 50 grams)  Begin ONS BID 4 oz (to provide 12 grams of fat per day if 100% consumed)  Consult RD as needed      Malnutrition Assessment:  Malnutrition Status:  Moderate malnutrition (12/17/24 1101)    Context:  Chronic Illness     Findings of the 6 clinical characteristics of malnutrition:  Energy Intake:  75% or less estimated energy requirements for 1 month or longer  Weight Loss:  Greater than 7.5% over 3 months (-15% in four months)     Body Fat Loss:   (moderate) Orbital, Triceps   Muscle Mass Loss:   (moderate) Temples (temporalis), Clavicles (pectoralis & deltoids), Hand (interosseous)  Fluid Accumulation:  Mild Extremities      Nutrition Assessment:    Pt admit w/ symptomatic cholelithiasis, painless jaundice, CBD obstruction. PMHx: Colon Cancer s/p sigmoidectomy w/ resection 11/10/24 at [facility]. Pt for possible ERCP 12/18/24. PMHx: CAD, CABG x 6, Pacemaker (2021), Colon and Prostate Cancer. Pt report 50lb weight loss in the past six months d/t cancer/treatment. Pt w/ poor po intake prior to admission. Will provide ONS, continue inpatient monitoring, f/up per policy.    Nutrition Related Findings:    A/O x4, I/O -300 since admit, abd wdl, +BS, Transaminitis, Jaundice, Bilirubin 18.2 Wound Type: None       Current Nutrition Intake & Therapies:    Average Meal Intake: 26-50%  Average Supplements Intake: None Ordered  ADULT DIET; Regular; Low Fat (less than or equal to 50 gm/day)  ADULT ORAL NUTRITION SUPPLEMENT; Dinner, Lunch; Standard 4 oz Oral Supplement    Anthropometric Measures:  Height: 180.3 cm (5' 10.98\")  Ideal Body Weight (IBW): 172 lbs (78 kg)    Admission Body Weight: 72.6 kg (160 lb 0.9 oz) (12/16/24 not specified)  Current Body Weight: 72.6 kg (160 lb 0.9 oz), 93.1 % IBW. Weight Source: Not specified (12/16/24)  Current

## 2024-12-17 NOTE — PROGRESS NOTES
Subjective:    The patient is awake and alert. He denies any pain. Hep gtt infusing. Is looking forward to having his procedure done tomorrow.     Objective:    /68   Pulse 60   Temp 97.9 °F (36.6 °C) (Axillary)   Resp 16   Ht 1.803 m (5' 10.98\")   Wt 72.6 kg (160 lb)   SpO2 97%   BMI 22.33 kg/m²     General: NAD  HEENT: No thrush or mucositis, EOMI, PERRLA  Heart:  RRR, no murmurs, gallops, or rubs.  Lungs:  CTA bilaterally, no wheeze, rales or rhonchi  Abd: BS present, nontender, nondistended, no masses  Extrem:  No clubbing, cyanosis, or edema  Lymphatics: No palpable adenopathy in cervical and supraclavicular regions  Skin: Intact, no petechia or purpura. Jaundice    CBC with Differential:    Lab Results   Component Value Date/Time    WBC 9.2 12/17/2024 02:15 AM    RBC 4.17 12/17/2024 02:15 AM    HGB 12.1 12/17/2024 02:15 AM    HCT 34.0 12/17/2024 02:15 AM     12/17/2024 02:15 AM    MCV 81.5 12/17/2024 02:15 AM    MCH 29.0 12/17/2024 02:15 AM    MCHC 35.6 12/17/2024 02:15 AM    RDW 21.8 12/17/2024 02:15 AM     CMP:    Lab Results   Component Value Date/Time     12/17/2024 02:15 AM    K 3.9 12/17/2024 02:15 AM     12/17/2024 02:15 AM    CO2 21 12/17/2024 02:15 AM    BUN 22 12/17/2024 02:15 AM    CREATININE 1.0 12/17/2024 02:15 AM    LABGLOM 75 12/17/2024 02:15 AM    GLUCOSE 89 12/17/2024 02:15 AM    CALCIUM 9.3 12/17/2024 02:15 AM    BILITOT 24.5 12/17/2024 02:22 AM    ALKPHOS 355 12/17/2024 02:15 AM    AST 54 12/17/2024 02:15 AM    ALT 37 12/17/2024 02:15 AM              Current Facility-Administered Medications:     sodium chloride flush 0.9 % injection 5-40 mL, 5-40 mL, IntraVENous, 2 times per day, Silvia Packer MD, 10 mL at 12/17/24 0927    sodium chloride flush 0.9 % injection 5-40 mL, 5-40 mL, IntraVENous, PRN, Silvia Packer MD    0.9 % sodium chloride infusion, , IntraVENous, PRN, Silvia Packer MD    potassium chloride (KLOR-CON M) extended release tablet 40 mEq,

## 2024-12-17 NOTE — H&P
Department of Internal Medicine  History and Physical    PCP: Dr. Lugo  Admitting Physician: Dr. Clay  Consultants: Dr. Han, Dr. Hamilton, Dr. Mojica      CHIEF COMPLAINT: CBD obstruction, generalized weakness    HISTORY OF PRESENT ILLNESS:    Patient is an 84 years old male with PMHx of A fib, colon cancer s/p sigmoidectomy/high anterior resection 11/10/20 ([facility]), pathology consistent with invasive moderately differentiated colonic adenocarcinoma with no positive lymph nodes,  prostate cancer treated with radiation and surgery, basal cell carcinoma of scalp s/p excision, CAD s/p CABG x6, pacemaker placement (2021) on Eliquis who is admitted under the diagnosis of new onset worsening jaundice, presyncope and weakness at [facility].  He underwent abdominal CT scan outpatient which showed metastatic lesions in the liver, stomach, lungs.  He was scheduled for hepatic biopsy next week at [facility].  He endorsed a lot of itching on his skin and had multiple excoriations due to itching.  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  Decision was made to transfer the patient to [facility] for plan of ERCP. patient stated that he did not followed up with anyone after his sigmoidectomy.  He also did not follow-up with any further colonoscopy in the past.  As per patient's daughter, sigmoidectomy surgery was too much for the patient so he refused to follow-up further with anyone.  Patient was on heparin drip at [facility] for history of atrial fibrillation and plan for any surgery needed.  Patient states that he is having abdominal discomfort and generalized weakness.  He denies any fever, chills, chest pain, palpitations, urinary frequency/urgency/burning.    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  Gallbladder mildly distended.  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  -CT abdomen pelvis done on 12/17/2024 showed  2.4 cm hypodense mass in the region of the pancreatic neck likely reflecting primary pancreatic neoplasm. Hepatic metastatic disease. Metastatic peripancreatic and periportal lymph nodes. Abdominal and pelvic ascites. Cholelithiasis.  -Total bilirubin and direct bilirubin elevated  -On Cholestyramine   -GI, general surgery and heme-onc on board  -Plan for ERCP and possible liver biopsy tomorrow  -On gentle hydration  -Heparin drip will be on hold 4 hours prior to ERCP  -Patient will be n.p.o. at midnight    Asymptomatic bacteriuria  -Urinalysis positive for nitrites  -Urine culture ordered  -Patient is asymptomatic  -Will monitor     Afib/CAD s/p CABG  Has a pacemaker in place  -Follows Dr. Mojica outpatient  -Cardiology on board    Dark stools/anemia  -Complaints of darkening of stools at [facility]  -GI bleed vs hyperbilirubinemia  -Iron panel, vitamin B12, folate ordered  -FOBT ordered  -GI consulted  -Will monitor H&H    DVT prophylaxis   -Heparin drip          Electronically signed by Silvia Packer MD on 12/17/2024 at 1:43 AM

## 2024-12-17 NOTE — PROGRESS NOTES
Patient was admitted on our floor from [facility] on a heparin drip running at 12U/kg/hr. An aPTT was ordered and the results came back as 147.5 sec. The heparin drip was held at 0150 per MAR instructions. This RN spoke with the pharmacist about situation; okay to resume Heparin drip from the 12U/kg/hr and continue based off of aPTT results and MAR admin instructions. Heparin now running at 9U/kg/hr.

## 2024-12-17 NOTE — PROGRESS NOTES
4 Eyes Skin Assessment     NAME:  Mark Martínez  YOB: 1940  MEDICAL RECORD NUMBER:  [id number]    The patient is being assessed for  Admission    I agree that at least one RN has performed a thorough Head to Toe Skin Assessment on the patient. ALL assessment sites listed below have been assessed.      Areas assessed by both nurses:    Head, Face, Ears, Shoulders, Back, Chest, Arms, Elbows, Hands, Sacrum. Buttock, Coccyx, Ischium, Legs. Feet and Heels, and Under Medical Devices         Does the Patient have a Wound? No noted wound(s)       Jose Manuel Prevention initiated by RN: No  Wound Care Orders initiated by RN: No    Pressure Injury (Stage 3,4, Unstageable, DTI, NWPT, and Complex wounds) if present, place Wound referral order by RN under : No    New Ostomies, if present place, Ostomy referral order under : No     Nurse 1 eSignature: Electronically signed by Beth Marquis RN on 12/17/24 at 12:50 AM EST    **SHARE this note so that the co-signing nurse can place an eSignature**    Nurse 2 eSignature: Electronically signed by Nesha Field RN on 12/17/24 at 12:51 AM EST

## 2024-12-17 NOTE — CARE COORDINATION
Case Management Assessment  Initial Evaluation    Date/Time of Evaluation: 12/17/2024 3:43 PM  Assessment Completed by: Kiera Irizarry RN    If patient is discharged prior to next notation, then this note serves as note for discharge by case management.    Patient Name: Mark Martínez                   YOB: 1940  Diagnosis: Symptomatic cholelithiasis [K80.20]  Common bile duct (CBD) obstruction [K83.1]                   Date / Time: 12/16/2024 11:32 PM    Patient Admission Status: Inpatient   Readmission Risk (Low < 19, Mod (19-27), High > 27): Readmission Risk Score: 13.1    Current PCP: Ritesh Lugo MD  PCP verified by CM? Yes    Chart Reviewed: Yes      History Provided by: Patient  Patient Orientation: Alert and Oriented    Patient Cognition: Alert    Hospitalization in the last 30 days (Readmission):  No    If yes, Readmission Assessment in CM Navigator will be completed.    Advance Directives:      Code Status: Full Code   Patient's Primary Decision Maker is: Legal Next of Kin      Discharge Planning:    Patient lives with: Alone Type of Home: House  Primary Care Giver: Self  Patient Support Systems include: Children   Current Financial resources:    Current community resources:    Current services prior to admission: None            Current DME:              Type of Home Care services:  None    ADLS  Prior functional level: Independent in ADLs/IADLs  Current functional level: Independent in ADLs/IADLs    PT AM-PAC:   /24  OT AM-PAC:   /24    Family can provide assistance at DC: No  Would you like Case Management to discuss the discharge plan with any other family members/significant others, and if so, who? No  Plans to Return to Present Housing: Yes    Potential Assistance needed at discharge: N/A            Potential DME:    Patient expects to discharge to: Unknown  Plan for transportation at discharge: Family    Financial    Payor: [facility] MEDICARE / Plan: [employer] MEDIBLUE ESSENTIAL/PLUS /  Product Type: *No Product type* /     Does insurance require precert for SNF: Yes    Potential assistance Purchasing Medications:    Meds-to-Beds request:        [id number] - [place], [place] - [address] - P [phone] - F [phone]  [address] [place] [address]  Phone: [phone] Fax: [phone]      Notes:    Factors facilitating achievement of predicted outcomes: Family support, Motivated, Cooperative, and Pleasant    Barriers to discharge: Medical complications    Additional Case Management Notes: Met with patient for transition of care planning.  Pt transferred from [facility] for possible ERCP.  Pt states that his lives alone, still drives.  He denies any discharge needs including HHC.  Pt plans to return home and states his daughter will provide transportation.    The Plan for Transition of Care is related to the following treatment goals of Symptomatic cholelithiasis [K80.20]  Common bile duct (CBD) obstruction [K83.1]      Kiera Irizarry RN  Case Management Department  Ph: [phone]

## 2024-12-17 NOTE — ANESTHESIA PRE PROCEDURE
Department of Anesthesiology  Preprocedure Note       Name:  Mark Martínez   Age:  84 y.o.  :  1940                                          MRN:  [id number]         Date:  2024      Surgeon: Surgeon(s):  Hamlet Han MD    Procedure: ESOPHAGOGASTRODUODENOSCOPY ULTRASOUND  ENDOSCOPIC RETROGRADE CHOLANGIOPANCREATOGRAPHY    Medications prior to admission:   Prior to Admission medications    Medication Sig Start Date End Date Taking? Authorizing Provider   apixaban (ELIQUIS) 5 MG TABS tablet Take 1 tablet by mouth 2 times daily   Yes Micheline Palmer MD   dofetilide (TIKOSYN) 250 MCG capsule Take 2 capsules by mouth 2 times daily    Micheline Palmer MD   sacubitril-valsartan (ENTRESTO) 49-51 MG per tablet Take 1 tablet by mouth 2 times daily    Micheline Palmer MD   fluorouracil (EFUDEX) 5 % cream Apply topically 2 times daily Apply topically 2 times daily.  Patient not taking: Reported on 2024    Micheline Palmer MD   pravastatin (PRAVACHOL) 40 MG tablet  3/28/19   Micheline Palmer MD   metoprolol tartrate (LOPRESSOR) 25 MG tablet Take 1 tablet by mouth 2 times daily  Patient not taking: Reported on 11/15/2024    Micheline Palmer MD   lisinopril (PRINIVIL;ZESTRIL) 20 MG tablet Take 20 mg by mouth daily  Patient not taking: Reported on 2024    Micheline Palmer MD   aspirin 81 MG tablet Take 81 mg by mouth daily  Patient not taking: Reported on 2024    Micheline Palmer MD       Current medications:    Current Facility-Administered Medications   Medication Dose Route Frequency Provider Last Rate Last Admin    sodium chloride flush 0.9 % injection 5-40 mL  5-40 mL IntraVENous 2 times per day Silvia Packer MD   10 mL at 24 0927    sodium chloride flush 0.9 % injection 5-40 mL  5-40 mL IntraVENous PRN Silvia Packer MD        0.9 % sodium chloride infusion   IntraVENous PRN Silvia Packer MD        potassium chloride (KLOR-CON M) extended  Prophylactic antiemetics administered.  Anesthetic plan and risks discussed with patient and spouse.      Plan discussed with CRNA.                    Roldan Hopson MD   12/18/2024

## 2024-12-17 NOTE — CONSULTS
GENERAL SURGERY  CONSULT NOTE  12/17/2024    Physician Consulted: Dr. Han  Reason for Consult: CBD Obstruction  Referring Physician: Dr. Birdie SERVIN  Mark Martínez is a 84 y.o. male with CAD s/p CABG x6, pacemaker placement (2021) on ELiquis, colon cancer, prostate cancer, basal cell carcinoma of scalp s/p excision, who presents for evaluation of painless jaundice. Patient initially presented to [facility] with fatigue and weight loss. He was started on a heparin drip for unclear reason and transferred here for further management.     Patient has prior history of colon cancer s/p sigmoidectomy/high anterior resection 11/10/20 ([facility]), pathology consistent with invasive moderately differentiated colonic adenocarcinoma with no positive lymph nodes.     Patient afebrile and hemodynamically stable on arrival. Labs notable for Tbili of 24.4, alk phos 355. CT imaging from [facility] on 11/21/24 reviewed.      Past Medical History:   Diagnosis Date    CAD (coronary artery disease)     Hypertension        Past Surgical History:   Procedure Laterality Date    CAROTID STENT PLACEMENT  2013    CARPAL TUNNEL RELEASE Right 12/27/2018    CARPAL TUNNEL RELEASE Right 12/27/2018    RELEASE TRANSVERSE CARPAL LIGAMENT RIGHT/MEDIAN NERVE EPINEUROTOMY performed by ALTHEA Tadeo DO at [facility] OR    CORONARY ARTERY BYPASS GRAFT  2014    x6 bypass dr. olivares     NECK SURGERY      cyst, benign    PACEMAKER PLACEMENT  08/2018    [facility]       Medications Prior to Admission:    Prior to Admission medications    Medication Sig Start Date End Date Taking? Authorizing Provider   apixaban (ELIQUIS) 5 MG TABS tablet Take 1 tablet by mouth 2 times daily   Yes ProviderMicheline MD   dofetilide (TIKOSYN) 250 MCG capsule Take 2 capsules by mouth 2 times daily    ProviderMicheline MD   sacubitril-valsartan (ENTRESTO) 49-51 MG per tablet Take 1 tablet by mouth 2 times daily    ProviderMicheline MD   fluorouracil (EFUDEX) 5 % cream  108*   CO2 21*   BUN 22   CREATININE 1.0   CALCIUM 9.3     Liver Function  Recent Labs     12/17/24  0215   BILITOT 24.4*   AST 54*   ALT 37   ALKPHOS 355*     No results for input(s): \"LACTATE\" in the last 72 hours.  No results for input(s): \"INR\" in the last 72 hours.    Invalid input(s): \"PT\", \"PTT\"    RADIOLOGY  OSH images reviewed - multifocal infiltrative liver lesions, intra and extrahepatic biliary ductal dilation with adjacent metastatic lymphadenopathy, peritoneal carcinomatosis, and new lung nodules in right lower lobe    ASSESSMENT:  84 y.o. male who was transferred from [place] on heparin drip with painless jaundice and concern for CBD obstruction secondary to compressive metastasis of unknown primary origin.     PLAN:  - heme/onc consulted, appreciate recommendations  - plan for repeat CT w/ IV now  - f/u fractionated bilirubin  - prn pain control  - trend LFTs  - NPO + mIVF  - unclear why patient is on heparin drip, will investigate further if this is able to be stopped  - pending CT results, may consider ERCP tomorrow, 12/18    Discussed with Dr. Han.     Electronically signed by Corina Leroy MD on 12/17/24 at 6:50 AM EST    Patient was seen and examined  I agree with the residents assessment and plan as noted above  Will get CT of the abdomen pelvis to evaluate the extent of intrahepatic biliary disease and extrahepatic obstruction  Okay for diet today, n.p.o. past midnight for possible ERCP  Discussed with Dr. Mojica, will hold heparin drip 4 hours prior to ERCP      Hamlet Han MD

## 2024-12-18 NOTE — PROGRESS NOTES
INR still elevated at 3.1 despite vitamin K  Will hold off on procedure today  Will schedule ERCP with E[facility] 11/19/2024    Hamlet Han MD

## 2024-12-18 NOTE — PROGRESS NOTES
Cardiology  Progress Note      SUBJECTIVE:  No chest pain. No dyspnea.  Extreme generalized body weakness.    Current Inpatient Medications  Current Facility-Administered Medications: dofetilide (TIKOSYN) capsule 125 mcg, 125 mcg, Oral, BID  sodium chloride flush 0.9 % injection 5-40 mL, 5-40 mL, IntraVENous, 2 times per day  sodium chloride flush 0.9 % injection 5-40 mL, 5-40 mL, IntraVENous, PRN  0.9 % sodium chloride infusion, , IntraVENous, PRN  potassium chloride (KLOR-CON M) extended release tablet 40 mEq, 40 mEq, Oral, PRN **OR** potassium bicarb-citric acid (EFFER-K) effervescent tablet 40 mEq, 40 mEq, Oral, PRN **OR** potassium chloride 10 mEq/100 mL IVPB (Peripheral Line), 10 mEq, IntraVENous, PRN  magnesium sulfate 2000 mg in 50 mL IVPB premix, 2,000 mg, IntraVENous, PRN  ondansetron (ZOFRAN-ODT) disintegrating tablet 4 mg, 4 mg, Oral, Q8H PRN **OR** [DISCONTINUED] ondansetron (ZOFRAN) injection 4 mg, 4 mg, IntraVENous, Q6H PRN  polyethylene glycol (GLYCOLAX) packet 17 g, 17 g, Oral, Daily PRN  acetaminophen (TYLENOL) tablet 650 mg, 650 mg, Oral, Q6H PRN **OR** acetaminophen (TYLENOL) suppository 650 mg, 650 mg, Rectal, Q6H PRN  cholestyramine (QUESTRAN) packet 4 g, 1 packet, Oral, BID  metoprolol tartrate (LOPRESSOR) tablet 100 mg, 100 mg, Oral, BID  heparin (porcine) injection 4,000 Units, 4,000 Units, IntraVENous, PRN  heparin (porcine) injection 2,000 Units, 2,000 Units, IntraVENous, PRN  heparin 25,000 units in dextrose 5% 250 mL (premix) infusion, 5-30 Units/kg/hr, IntraVENous, Continuous  morphine (PF) injection 2 mg, 2 mg, IntraVENous, Q4H PRN  pantoprazole (PROTONIX) 40 mg in sodium chloride (PF) 0.9 % 10 mL injection, 40 mg, IntraVENous, Q6H      Physical  VITALS:  /67   Pulse 58   Temp 97.5 °F (36.4 °C)   Resp 18   Ht 1.803 m (5' 10.98\")   Wt 72.6 kg (160 lb)   SpO2 99%   BMI 22.33 kg/m²   CURRENT TEMPERATURE:  Temp: 97.5 °F (36.4 °C)  CONSTITUTIONAL: No acute distress.  EYES:

## 2024-12-18 NOTE — PROGRESS NOTES
Physical Therapy  Physical Therapy Initial Evaluation/Plan of Care    Room #:  0623/0623-02  Patient Name: Mark Martínez  YOB: 1940  MRN: [id number]    Date of Service: 12/18/2024     Tentative placement recommendation: Home with Home Health Physical Therapy  Equipment recommendation: Patient has needed equipment       Evaluating Physical Therapist: Corey Collazo PT, DPT #[id number]      Specific Provider Orders/Date/Referring Provider :     12/17/24 0130    PT evaluation and treat  Start:  12/17/24 0130,   End:  12/17/24 0130,   ONE TIME,   Standing Count:  1 Occurrences,   R       Silvia Packer MD Acknowledge New    Admitting Diagnosis:   Symptomatic cholelithiasis [K80.20]  Common bile duct (CBD) obstruction [K83.1]      Surgery: none      Patient Active Problem List   Diagnosis    Carpal tunnel syndrome of right wrist    Symptomatic cholelithiasis    Common bile duct (CBD) obstruction    Moderate protein-calorie malnutrition (HCC)        ASSESSMENT of Current Deficits Patient exhibits decreased strength, balance, and endurance impairing functional mobility, transfers, gait , gait distance, and tolerance to activity. Pt mildly unsteady with ambulation and stairs with no LOB, dizziness, or SOB requiring increased time for all mobility during session.        PHYSICAL THERAPY  PLAN OF CARE       Physical therapy plan of care is established based on physician order,  patient diagnosis and clinical assessment    Current Treatment Recommendations:    -Bed Mobility: Lower and upper extremity exercises, and trunk control activities  -Sitting Balance: Incorporate reaching activities to activate trunk muscles , Hands on support to maintain midline , Facilitate active trunk muscle engagement , Facilitate postural control in all planes , and Engage in core activities to allow for movement within base of support   -Standing Balance: Perform strengthening exercises in standing to promote motor control with or  RIGHT/MEDIAN NERVE EPINEUROTOMY performed by ALTHEA Tadeo DO at [facility] OR    CORONARY ARTERY BYPASS GRAFT  2014    x6 bypass dr. olivares     NECK SURGERY      cyst, benign    PACEMAKER PLACEMENT  08/2018    [facility]       SUBJECTIVE:    Precautions: Up with assistance, falls, dementia, and Afib, anemia, jaundice      Social history: Patient lives alone in a ranch home  with 4 steps, with rail  to enter home with Sponge bathes      Equipment owned: Cane, Rollator, Shower chair, and Tub transfer bench, lift chair    AM-PAC Basic Mobility       AM-PAC Basic Mobility - Inpatient   How much help is needed turning from your back to your side while in a flat bed without using bedrails?: A Little  How much help is needed moving from lying on your back to sitting on the side of a flat bed without using bedrails?: A Little  How much help is needed moving to and from a bed to a chair?: A Little  How much help is needed standing up from a chair using your arms?: A Little  How much help is needed walking in hospital room?: A Little  How much help is needed climbing 3-5 steps with a railing?: A Little  AM-[facility] Inpatient Mobility Raw Score : 18  AM-PAC Inpatient T-Scale Score : 43.63  Mobility Inpatient CMS 0-100% Score: 46.58  Mobility Inpatient CMS G-Code Modifier : CK    Nursing cleared patient for PT evaluation. The admitting diagnosis and active problem list as listed above have been reviewed prior to the initiation of this evaluation.    OBJECTIVE:   Initial Evaluation  Date: 12/18/2024 Treatment Date:     Short Term/ Long Term   Goals   Was pt agreeable to Eval/treatment? Yes  To be met in 2 days   Pain level   0/10       Bed Mobility  Using rails and head of bed elevated:     Rolling: Supervision     Supine to sit: Supervision     Sit to supine: Supervision     Scooting: Supervision     Rolling: Independent    Supine to sit: Independent    Sit to supine: Independent    Scooting: Independent     Transfers Sit to

## 2024-12-18 NOTE — PLAN OF CARE
Problem: Discharge Planning  Goal: Discharge to home or other facility with appropriate resources  Outcome: Progressing     Problem: Safety - Adult  Goal: Free from fall injury  Outcome: Progressing     Problem: Nutrition Deficit:  Goal: Optimize nutritional status  12/17/2024 2334 by Shahnaz Valentin RN  Outcome: Progressing  12/17/2024 1106 by Alka Thomas RD, LD  Outcome: Progressing  Flowsheets (Taken 12/17/2024 1106)  Nutrient intake appropriate for improving, restoring, or maintaining nutritional needs:   Assess nutritional status and recommend course of action   Monitor oral intake, labs, and treatment plans   Recommend appropriate diets, oral nutritional supplements, and vitamin/mineral supplements   Order, calculate, and assess calorie counts as needed     Problem: Skin/Tissue Integrity  Goal: Absence of new skin breakdown  Description: 1.  Monitor for areas of redness and/or skin breakdown  2.  Assess vascular access sites hourly  3.  Every 4-6 hours minimum:  Change oxygen saturation probe site  4.  Every 4-6 hours:  If on nasal continuous positive airway pressure, respiratory therapy assess nares and determine need for appliance change or resting period.  Outcome: Progressing

## 2024-12-18 NOTE — PROGRESS NOTES
Primary Care Physician: Ritesh Lugo MD   Admitting Physician:  Akira Clay MD  Admission date and time: 12/16/2024 11:32 PM    Room:  [address] [place] [address]  Admitting diagnosis: Symptomatic cholelithiasis [K80.20]  Common bile duct (CBD) obstruction [K83.1]    Patient Name: Mark Martínez  MRN: [id number]    Date of Service: 12/18/2024     Subjective:  Mark is a 84 y.o. male who was seen and examined today,12/18/2024, at the bedside. No family present during my examination.  Patient is AO x 3 and is feeling better today.  His INR was elevated today; was 6 and 1 dose of vitamin K was given which bring INR to 3.1.  ERCP is on hold today and will be done tomorrow with EUS.  Patient is denying any abdominal pain, fever, chills and is tolerating the diet well.    Review of System:   Constitutional:   Denies fever or chills, weight loss or gain, fatigue or malaise.positive for generalized weakness, fatigue   HEENT:   Denies ear pain, sore throat, sinus or eye problems.  Cardiovascular:   Denies any chest pain, irregular heartbeats, or palpitations.   Respiratory:   Denies shortness of breath, coughing, sputum production, hemoptysis, or wheezing.  Gastrointestinal:   Denies nausea, vomiting, diarrhea, or constipation.   Positive for slight abdominal pain   Genitourinary:    Denies any urgency, frequency, hematuria. Voiding  without difficulty.  Extremities:   Denies lower extremity swelling, edema or cyanosis.   Neurology:    Denies any headache or focal neurological deficits, Denies generalized weakness or memory difficulty.   Psch:   Denies being anxious or depressed.  Musculoskeletal:    Denies  myalgias, joint complaints or back pain.   Integumentary:   Denies any rashes, ulcers, or excoriations.  Denies bruising. Positive for generalized jaundice,   Hematologic/Lymphatic:  Denies bruising or bleeding.    Physical Exam:  No intake/output data recorded.    Intake/Output Summary (Last 24 hours) at 12/18/2024 1302  Last data  filed at 12/18/2024 0808  Gross per 24 hour   Intake 240 ml   Output 600 ml   Net -360 ml   I/O last 3 completed shifts:  In: 240 [P.O.:240]  Out: 1300 [Urine:1300]  Patient Vitals for the past 96 hrs (Last 3 readings):   Weight   12/16/24 2330 72.6 kg (160 lb)     Vital Signs:   Blood pressure 103/67, pulse 58, temperature 97.5 °F (36.4 °C), resp. rate 18, height 1.803 m (5' 10.98\"), weight 72.6 kg (160 lb), SpO2 99%.    General appearance:  Alert, responsive, oriented to person, place, and time. Well preserved, alert, no distress.  Head:  Normocephalic. No masses, lesions or tenderness.  Eyes:  PERRLA.  EOMI.  Sclera clear.  Buccal mucosa moist. sclera yellow   ENT:  Ears normal. Mucosa normal.  Neck:    Supple. Trachea midline. No thyromegaly. No JVD. No bruits.  Heart:    Rhythm regular. Rate controlled.  No murmurs.  Lungs:    Symmetrical. Clear to auscultation bilaterally.  No wheezes. No rhonchi. No rales.  Abdomen:   Soft. Non-tender. Non-distended. Bowel sounds positive. No organomegaly or masses.  No pain on palpation.  Extremities:    Peripheral pulses present.  No peripheral edema.  No ulcers. No cyanosis. No clubbing.  Neurologic:    Alert x 3.  No focal deficit.  Cranial nerves grossly intact. No focal weakness.  Psych:   Behavior is normal. Mood appears normal. Speech is not rapid and/or pressured.  Musculoskeletal:   Spine ROM normal. Muscular strength intact. Gait not assessed.  Integumentary:  No rashes  Generalized jaundice skin   Genitalia/Breast:  Deferred    Medication:  Scheduled Meds:   dofetilide  125 mcg Oral BID    sodium chloride flush  5-40 mL IntraVENous 2 times per day    cholestyramine  1 packet Oral BID    metoprolol tartrate  100 mg Oral BID    pantoprazole (PROTONIX) 40 mg in sodium chloride (PF) 0.9 % 10 mL injection  40 mg IntraVENous Q6H     Continuous Infusions:   sodium chloride      heparin (PORCINE) Infusion Stopped (12/18/24 0530)       Objective Data:  CBC with  ordered  -FOBT pending  -GI on board  -Will monitor H&H     DVT prophylaxis   -Heparin drip    Continue current therapy.  See orders for further plan of care.      More than 50% of my time was spent at the bedside counseling/coordinating care with the patient and/or family with face to face contact.  This time was spent reviewing notes and laboratory data as well as instructing and counseling the patient. Time I spent with the family or surrogate(s) is included only if the patient was incapable of providing the necessary information or participating in medical decisions. I also discussed the differential diagnosis and all of the proposed management plans with the patient and individuals accompanying the patient. I am readily available for any further decision-making and intervention.       Electronically signed by Silvia Packer MD on 12/18/2024 at 1:02 PM

## 2024-12-18 NOTE — PROGRESS NOTES
OCCUPATIONAL THERAPY INITIAL EVALUATION    [facility]  [address] [facility]         Date:2024                                                   Patient Name: Mark Martínez     MRN: [id number]     : 1940     Room: [address] [place] [address]       Evaluating OT: Deborah Ellis, SHERRONR/L; [id number]       Referring Provider and Orders/Date:    OT eval and treat  Start:  24,   End:  24,   ONE TIME,   Standing Count:  1 Occurrences,   Silvia Buitrago MD    Recommended placement: home with hh and family assist ; pt declining subacute at this time        Diagnosis:   1. Symptomatic cholelithiasis         Surgery: 24 ESOPHAGOGASTRODUODENOSCOPY ULTRASOUND  ENDOSCOPIC RETROGRADE CHOLANGIOPANCREATOGRAPHY      Pertinent Medical History:        Past Medical History:   Diagnosis Date    CAD (coronary artery disease)     Hypertension           Past Surgical History:   Procedure Laterality Date    CAROTID STENT PLACEMENT  2013    CARPAL TUNNEL RELEASE Right 2018    CARPAL TUNNEL RELEASE Right 2018    RELEASE TRANSVERSE CARPAL LIGAMENT RIGHT/MEDIAN NERVE EPINEUROTOMY performed by ALTHEA Tadeo DO at [facility] OR    CORONARY ARTERY BYPASS GRAFT  2014    x6 bypass dr. olivares     NECK SURGERY      cyst, benign    PACEMAKER PLACEMENT  2018    [facility]       Precautions:  Fall Risk, dementia        Assessment of current deficits     [x] Functional mobility  [x]ADLs  [x] Strength               [x]Cognition     [x] Functional transfers   [x] IADLs         [x] Safety Awareness   [x]Endurance     [] Fine Coordination              [x] Balance      [] Vision/perception   []Sensation      [x]Gross Motor Coordination  [] ROM  [] Delirium                   [] Motor Control     OT PLAN OF CARE   OT POC based on physician orders, patient diagnosis and results of clinical assessment    Frequency/Duration 1-3 days/wk for 2 weeks PRN

## 2024-12-18 NOTE — CARE COORDINATION
Patient seen and examined.  Daughter at bedside.  Patient with extensive GI history including colon cancer -patient gastroenterologist is Dr. Sims.  Recommend changing consultation orders to reflect patient wishes and to ensure continuity of care.  Thank you    Jesse Sifuentes MD

## 2024-12-18 NOTE — PROGRESS NOTES
Subjective:    The patient is awake and alert.  No problems overnight.  Denies any pain.     Objective:    BP 95/62   Pulse 60   Temp (!) 96.3 °F (35.7 °C)   Resp 16   Ht 1.803 m (5' 10.98\")   Wt 72.6 kg (160 lb)   SpO2 99%   BMI 22.33 kg/m²     General: Alert, oriented, no acute distress  HEENT: No thrush or mucositis, EOMI, PERRLA  Heart:  RRR, no murmurs, gallops, or rubs.  Lungs:  CTA bilaterally, no wheeze, rales or rhonchi  Abd: BS present, nontender, nondistended, no masses  Extrem:  No clubbing, cyanosis, or edema  Lymphatics: No palpable adenopathy in cervical and supraclavicular regions  Skin: Intact, no petechia or purpura. Jaundice    CBC with Differential:    Lab Results   Component Value Date/Time    WBC 7.8 12/18/2024 03:39 AM    RBC 3.86 12/18/2024 03:39 AM    HGB 11.5 12/18/2024 03:39 AM    HGB 11.4 12/18/2024 03:39 AM    HCT 32.0 12/18/2024 03:39 AM    HCT 31.7 12/18/2024 03:39 AM     12/18/2024 03:39 AM    MCV 82.1 12/18/2024 03:39 AM    MCH 29.5 12/18/2024 03:39 AM    MCHC 36.0 12/18/2024 03:39 AM    RDW 21.7 12/18/2024 03:39 AM    METASPCT 2 12/18/2024 03:39 AM    LYMPHOPCT 4 12/18/2024 03:39 AM    MONOPCT 8 12/18/2024 03:39 AM    EOSPCT 0 12/18/2024 03:39 AM    BASOPCT 0 12/18/2024 03:39 AM    MONOSABS 0.61 12/18/2024 03:39 AM    LYMPHSABS 0.27 12/18/2024 03:39 AM    EOSABS 0.00 12/18/2024 03:39 AM    BASOSABS 0.00 12/18/2024 03:39 AM     CMP:    Lab Results   Component Value Date/Time     12/18/2024 03:39 AM    K 3.4 12/18/2024 03:39 AM     12/18/2024 03:39 AM    CO2 22 12/18/2024 03:39 AM    BUN 23 12/18/2024 03:39 AM    CREATININE 1.1 12/18/2024 03:39 AM    LABGLOM 66 12/18/2024 03:39 AM    GLUCOSE 110 12/18/2024 03:39 AM    CALCIUM 9.0 12/18/2024 03:39 AM    BILITOT 22.9 12/18/2024 03:39 AM    ALKPHOS 312 12/18/2024 03:39 AM    AST 57 12/18/2024 03:39 AM    ALT 39 12/18/2024 03:39 AM              Current Facility-Administered Medications:     phytonadione (ADULT)  (VITAMIN K) 5 mg in sodium chloride 0.9 % 100 mL IVPB, 5 mg, IntraVENous, Once, Kajal Hamilton MD    dofetilide (TIKOSYN) capsule 125 mcg, 125 mcg, Oral, BID, James Mojica MD    sodium chloride flush 0.9 % injection 5-40 mL, 5-40 mL, IntraVENous, 2 times per day, Silvia Packer MD, 10 mL at 12/17/24 0927    sodium chloride flush 0.9 % injection 5-40 mL, 5-40 mL, IntraVENous, PRN, Silvia Packer MD    0.9 % sodium chloride infusion, , IntraVENous, PRN, Silvia Packer MD    potassium chloride (KLOR-CON M) extended release tablet 40 mEq, 40 mEq, Oral, PRN **OR** potassium bicarb-citric acid (EFFER-K) effervescent tablet 40 mEq, 40 mEq, Oral, PRN **OR** potassium chloride 10 mEq/100 mL IVPB (Peripheral Line), 10 mEq, IntraVENous, PRN, Silvia Packer MD    magnesium sulfate 2000 mg in 50 mL IVPB premix, 2,000 mg, IntraVENous, PRN, Silvia Packer MD    ondansetron (ZOFRAN-ODT) disintegrating tablet 4 mg, 4 mg, Oral, Q8H PRN **OR** [DISCONTINUED] ondansetron (ZOFRAN) injection 4 mg, 4 mg, IntraVENous, Q6H PRN, Silvia Packer MD    polyethylene glycol (GLYCOLAX) packet 17 g, 17 g, Oral, Daily PRN, Silvia Packer MD    acetaminophen (TYLENOL) tablet 650 mg, 650 mg, Oral, Q6H PRN **OR** acetaminophen (TYLENOL) suppository 650 mg, 650 mg, Rectal, Q6H PRN, Silvia Packer MD    cholestyramine (QUESTRAN) packet 4 g, 1 packet, Oral, BID, Silvia Packer MD, 4 g at 12/17/24 2121    metoprolol tartrate (LOPRESSOR) tablet 100 mg, 100 mg, Oral, BID, Silvia Packer MD, 100 mg at 12/17/24 0924    heparin (porcine) injection 4,000 Units, 4,000 Units, IntraVENous, PRN, Silvia Packer MD    heparin (porcine) injection 2,000 Units, 2,000 Units, IntraVENous, PRN, Silvia Packer MD    heparin 25,000 units in dextrose 5% 250 mL (premix) infusion, 5-30 Units/kg/hr, IntraVENous, Continuous, Silvia Packer MD, Held at 12/18/24 0530    morphine (PF) injection 2 mg, 2 mg, IntraVENous, Q4H PRN, Silvia Packer,  MD    pantoprazole (PROTONIX) 40 mg in sodium chloride (PF) 0.9 % 10 mL injection, 40 mg, IntraVENous, Q6H, Jesse Sifuentes MD, 40 mg at 12/18/24 0512    CT ABDOMEN PELVIS W IV CONTRAST Additional Contrast? None   Final Result   1. 2.4 cm hypodense mass in the region of the pancreatic neck likely   reflecting primary pancreatic neoplasm.   2. Hepatic metastatic disease.   3. Metastatic peripancreatic and periportal lymph nodes.   4. Abdominal and pelvic ascites.   5. Cholelithiasis.   6. Nonobstructive 9 mm calculus in the right kidney.   7. Sigmoid diverticulosis without diverticulitis.             Assessment:    Principal Problem:    Symptomatic cholelithiasis  Active Problems:    Common bile duct (CBD) obstruction  Resolved Problems:    * No resolved hospital problems. *    84 year old gentleman with a history of a-fib, stage IIb colon cancer-sigmoid colon resection Nov 2020, prostate cancer s/p XRT in 2001, found to have 2 ill-defined lesions located within the left hepatic lob with the largest measuring 4.5 x 4.3 x 4.5, on CT scan and multiple sub-6 mm solid appearing nodules within the right lower lobe. MRI shows large infiltrative left hepatic lobe mass and dilated CBD. He is for a ERCP with biopsy and stent placement tomorrow.  He is on heparin gtt for history of a-fib.  12/15 CEA 74.4, PSA 0.11, CA 19-9 pending    Plan:    - Continue hep gtt-to be placed on hold 2 hours prior to procedure today. INR 6.9. Despite Vit K INR still elevated at 3.1. Procedure postponed  - Gen surg following, ERCP with brushings when INR stabilizes. If unable to biopsy/formulate diagnosis, will obtain CT guided bx  - Continue supportive care  - Plan to follow up with Dr. Calderón at [facility] to review bx and formulate treatment plan once medially stable for discharge.    Collaboration of care with Dr. Calderón    Electronically signed by GARRY Jaquez - CNP on 12/18/2024 at 6:54 AM    Attending addendum:

## 2024-12-19 ENCOUNTER — ANESTHESIA (OUTPATIENT)
Dept: OPERATING ROOM | Age: 84
End: 2024-12-19
Payer: MEDICARE

## 2024-12-19 ENCOUNTER — ANESTHESIA EVENT (OUTPATIENT)
Dept: OPERATING ROOM | Age: 84
End: 2024-12-19
Payer: MEDICARE

## 2024-12-19 PROCEDURE — 6360000002 HC RX W HCPCS: Performed by: NURSE ANESTHETIST, CERTIFIED REGISTERED

## 2024-12-19 PROCEDURE — 2580000003 HC RX 258: Performed by: NURSE ANESTHETIST, CERTIFIED REGISTERED

## 2024-12-19 RX ORDER — SODIUM CHLORIDE 9 MG/ML
INJECTION, SOLUTION INTRAVENOUS
Status: DISCONTINUED | OUTPATIENT
Start: 2024-12-19 | End: 2024-12-19 | Stop reason: SDUPTHER

## 2024-12-19 RX ORDER — LIDOCAINE HYDROCHLORIDE 20 MG/ML
INJECTION, SOLUTION INFILTRATION; PERINEURAL
Status: DISCONTINUED | OUTPATIENT
Start: 2024-12-19 | End: 2024-12-19 | Stop reason: SDUPTHER

## 2024-12-19 RX ORDER — PROPOFOL 10 MG/ML
INJECTION, EMULSION INTRAVENOUS
Status: DISCONTINUED | OUTPATIENT
Start: 2024-12-19 | End: 2024-12-19 | Stop reason: SDUPTHER

## 2024-12-19 RX ADMIN — SODIUM CHLORIDE: 9 INJECTION, SOLUTION INTRAVENOUS at 14:43

## 2024-12-19 RX ADMIN — PROPOFOL 20 MG: 10 INJECTION, EMULSION INTRAVENOUS at 15:29

## 2024-12-19 RX ADMIN — PROPOFOL 20 MG: 10 INJECTION, EMULSION INTRAVENOUS at 14:59

## 2024-12-19 RX ADMIN — PROPOFOL 20 MG: 10 INJECTION, EMULSION INTRAVENOUS at 15:07

## 2024-12-19 RX ADMIN — LIDOCAINE HYDROCHLORIDE 60 MG: 20 INJECTION, SOLUTION INFILTRATION; PERINEURAL at 14:49

## 2024-12-19 RX ADMIN — PROPOFOL 20 MG: 10 INJECTION, EMULSION INTRAVENOUS at 15:10

## 2024-12-19 RX ADMIN — PROPOFOL 30 MCG/KG/MIN: 10 INJECTION, EMULSION INTRAVENOUS at 14:50

## 2024-12-19 RX ADMIN — PROPOFOL 20 MG: 10 INJECTION, EMULSION INTRAVENOUS at 14:54

## 2024-12-19 RX ADMIN — PROPOFOL 20 MG: 10 INJECTION, EMULSION INTRAVENOUS at 15:14

## 2024-12-19 RX ADMIN — PROPOFOL 30 MG: 10 INJECTION, EMULSION INTRAVENOUS at 14:49

## 2024-12-19 NOTE — ANESTHESIA PRE PROCEDURE
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 olivares     NECK SURGERY      cyst, benign    PACEMAKER PLACEMENT  08/2018    [facility]       Social History:    Social History     Tobacco Use    Smoking status: Never    Smokeless tobacco: Never   Substance Use Topics    Alcohol use: Yes     Comment: couple of times a week                                Counseling given: Not Answered      Vital Signs (Current):   Vitals:    12/18/24 1945 12/18/24 2332 12/19/24 0730 12/19/24 1149   BP: 119/74 111/75 108/71 120/72   Pulse: 68 70 72 60   Resp: 18 23 20 21   Temp:  97.5 °F (36.4 °C) 98.1 °F (36.7 °C) 97.5 °F (36.4 °C)   TempSrc:  Oral Oral Oral   SpO2: 98% 97% 98% 98%   Weight:       Height:                                                  BP Readings from Last 3 Encounters:   12/19/24 120/72   11/15/24 111/72   11/06/24 108/70       NPO Status: Time of last liquid consumption: 0000           Time of last solid consumption: 0000                        Date of last liquid consumption: 12/18/24                        Date of last solid food consumption: 12/18/24    BMI:   Wt Readings from Last 3 Encounters:   12/16/24 72.6 kg (160 lb)   11/06/24 81.6 kg (180 lb)   09/20/24 85.3 kg (188 lb)     Body mass index is 22.33 kg/m².    CBC:   Lab Results   Component Value Date/Time    WBC 8.2 12/19/2024 05:38 AM    RBC 3.74 12/19/2024

## 2024-12-19 NOTE — PROGRESS NOTES
Cardiology  Progress Note      SUBJECTIVE:  Patient was sleeping flat in bed when I came to see him.  He looked very weak    Current Inpatient Medications  Current Facility-Administered Medications: dofetilide (TIKOSYN) capsule 125 mcg, 125 mcg, Oral, BID  sodium chloride flush 0.9 % injection 5-40 mL, 5-40 mL, IntraVENous, 2 times per day  sodium chloride flush 0.9 % injection 5-40 mL, 5-40 mL, IntraVENous, PRN  0.9 % sodium chloride infusion, , IntraVENous, PRN  potassium chloride (KLOR-CON M) extended release tablet 40 mEq, 40 mEq, Oral, PRN **OR** potassium bicarb-citric acid (EFFER-K) effervescent tablet 40 mEq, 40 mEq, Oral, PRN **OR** potassium chloride 10 mEq/100 mL IVPB (Peripheral Line), 10 mEq, IntraVENous, PRN  magnesium sulfate 2000 mg in 50 mL IVPB premix, 2,000 mg, IntraVENous, PRN  ondansetron (ZOFRAN-ODT) disintegrating tablet 4 mg, 4 mg, Oral, Q8H PRN **OR** [DISCONTINUED] ondansetron (ZOFRAN) injection 4 mg, 4 mg, IntraVENous, Q6H PRN  polyethylene glycol (GLYCOLAX) packet 17 g, 17 g, Oral, Daily PRN  acetaminophen (TYLENOL) tablet 650 mg, 650 mg, Oral, Q6H PRN **OR** acetaminophen (TYLENOL) suppository 650 mg, 650 mg, Rectal, Q6H PRN  cholestyramine (QUESTRAN) packet 4 g, 1 packet, Oral, BID  metoprolol tartrate (LOPRESSOR) tablet 100 mg, 100 mg, Oral, BID  heparin (porcine) injection 4,000 Units, 4,000 Units, IntraVENous, PRN  heparin (porcine) injection 2,000 Units, 2,000 Units, IntraVENous, PRN  [Held by provider] heparin 25,000 units in dextrose 5% 250 mL (premix) infusion, 5-30 Units/kg/hr, IntraVENous, Continuous  morphine (PF) injection 2 mg, 2 mg, IntraVENous, Q4H PRN  pantoprazole (PROTONIX) 40 mg in sodium chloride (PF) 0.9 % 10 mL injection, 40 mg, IntraVENous, Q6H      Physical  VITALS:  /71   Pulse 70   Temp 98.1 °F (36.7 °C) (Oral)   Resp 20   Ht 1.803 m (5' 10.98\")   Wt 72.6 kg (160 lb)   SpO2 98%   BMI 22.33 kg/m²   CURRENT TEMPERATURE:  Temp: 98.1 °F (36.7  °C)  CONSTITUTIONAL: No acute distress.  EYES: Vision is intact.  ENT: No sore throat.  No ear drainage.  NECK: No JVD.  BACK: Symmetric.  LUNGS:  diminished breath sounds right base and left base  CARDIOVASCULAR:  normal S1 and S2, no S3, and no S4  ABDOMEN:  non-tender  NEUROLOGIC: No focal deficits.    EXTREMITIES: No edema cyanosis or clubbing.    DATA:        Cardiology Labs:    Lab Results   Component Value Date/Time     12/19/2024 05:38 AM    K 3.6 12/19/2024 05:38 AM     12/19/2024 05:38 AM    CO2 22 12/19/2024 05:38 AM    BUN 22 12/19/2024 05:38 AM    CREATININE 1.1 12/19/2024 05:38 AM    GLUCOSE 96 12/19/2024 05:38 AM    CALCIUM 9.1 12/19/2024 05:38 AM    LABGLOM 68 12/19/2024 05:38 AM      CBC:    Recent Labs     12/19/24  0538 12/18/24  2352 12/18/24  1516 12/18/24  0901 12/18/24  0339 12/17/24  2215 12/17/24  0215   WBC 8.2  --   --   --  7.8  --  9.2   HGB 10.8* 10.7* 11.6*   < > 11.4*  11.5*   < > 12.1*   HCT 31.0* 30.3* 33.4*   < > 31.7*  32.0*   < > 34.0*   MCV 82.9  --   --   --  82.1  --  81.5     --   --   --  230  --  230    < > = values in this interval not displayed.     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 Status post cardioversion 2021.  Coronary artery disease.  Status post CABG 2014.  Sick sinus syndrome.  Status post permanent pacemaker 2018.  History of colon cancer s/p sigmoidectomy/high anterior resection 11/10/20 ([facility]),     Patient is found to have severe jaundice recently and he is being evaluated for that.    High suspicion for cancer with metastasis mostly from pancreatic origin    Dr. Han to do ERCP.    From the cardiac standpoint he appears to be doing fairly okay.  He is in paced rhythm on the EKG    He has no

## 2024-12-19 NOTE — CARE COORDINATION
12/19/2024 1535 CM attempted to meet with pt earlier and he was sleeping and requested CM come back for transition of care needs.  Attempted again but pt is off the floor for an ERCP, will do full assessment when pt is available.  CM will follow.Electronically signed by Micheline Michelle RN on 12/19/2024 at 3:44 PM

## 2024-12-19 NOTE — PROGRESS NOTES
Subjective:    The patient is awake and alert, family present at the bedside.  No problems overnight, did not sleep well because he is nervous about his procedure.  Denies any pain or discomfort. Patient and family had a lot of questions regarding his current condition, procedure, lab results, and possible treatment. I was able to address all of their questions and concerns to their satisfaction. NPO for procedure.    Objective:    /75   Pulse 70   Temp 97.5 °F (36.4 °C) (Oral)   Resp 23   Ht 1.803 m (5' 10.98\")   Wt 72.6 kg (160 lb)   SpO2 97%   BMI 22.33 kg/m²     General: Alert, oriented, no acute distress. Appears ill  HEENT: No thrush or mucositis, EOMI, PERRLA  Heart:  RRR, no murmurs, gallops, or rubs.  Lungs:  CTA bilaterally, no wheeze, rales or rhonchi  Abd: BS present, nontender, nondistended, no masses  Extrem:  No clubbing, cyanosis, or edema  Lymphatics: No palpable adenopathy in cervical and supraclavicular regions  Skin: Intact, no petechia or purpura. Jaundice    CBC with Differential:    Lab Results   Component Value Date/Time    WBC 8.2 12/19/2024 05:38 AM    RBC 3.74 12/19/2024 05:38 AM    HGB 10.8 12/19/2024 05:38 AM    HCT 31.0 12/19/2024 05:38 AM     12/19/2024 05:38 AM    MCV 82.9 12/19/2024 05:38 AM    MCH 28.9 12/19/2024 05:38 AM    MCHC 34.8 12/19/2024 05:38 AM    RDW 21.7 12/19/2024 05:38 AM    NRBC PENDING 12/19/2024 05:38 AM    METASPCT PENDING 12/19/2024 05:38 AM    LYMPHOPCT PENDING 12/19/2024 05:38 AM    PROMYELOPCT PENDING 12/19/2024 05:38 AM    MONOPCT PENDING 12/19/2024 05:38 AM    MYELOPCT PENDING 12/19/2024 05:38 AM    EOSPCT PENDING 12/19/2024 05:38 AM    BASOPCT PENDING 12/19/2024 05:38 AM    MONOSABS PENDING 12/19/2024 05:38 AM    LYMPHSABS PENDING 12/19/2024 05:38 AM    EOSABS PENDING 12/19/2024 05:38 AM    BASOSABS PENDING 12/19/2024 05:38 AM     CMP:    Lab Results   Component Value Date/Time     12/18/2024 03:39 AM    K 3.4 12/18/2024 03:39 AM  LFT's  - Gen surg following, ERCP with brushings when INR stabilizes. If unable to biopsy/formulate diagnosis, will obtain CT guided bx  - Continue supportive care  - Plan to follow up with Dr. Calderón at [facility] to review bx and formulate treatment plan once medially stable for discharge.    Collaboration of care with Dr. Hamilton    Electronically signed by GARRY Jaquez - CNP on 12/19/2024 at 6:34 AM    Addendum: Case reviewed and agree with above assessment and plan. INR normalized after vitamin K. Hopefully ERCP today.    Kajal Hamilton MD

## 2024-12-19 NOTE — CONSULTS
Hepatobiliary and Pancreatic Surgery Attending History and Physical    Patient's Name/Date of Birth: Mark Martínez /1940 (84 y.o.)    Date: December 19, 2024     CC:metastatic cancer     HPI:  Mr. Martínez is a 83 yo M with PMH CAD, HTN, CABG, pacemaker, T3, N0 colon ca s/p LAR in 2022, basal cell carcinoma, prostate ca who presented with 1 month of jaundice. Labs in ED showed bili of 26, DJC823, AST/ALT 58/41, INR 1.2. CT a/p showed 2.5cm panc neck lesion with multiple liver lesions concerning for metastasis. He underwent ERCP with EUS today with biliary stenting. Tumor markers pending. Patient also admits to weight loss 50 lbs since summer, poor appetitie, lina colored stools, dark urine. Has no family hx of hepatobiliary malignancies. Denies smoking, He does drink about 4 beers per week.     Past Medical History:   Diagnosis Date    CAD (coronary artery disease)     Hypertension        Past Surgical History:   Procedure Laterality Date    CAROTID STENT PLACEMENT  2013    CARPAL TUNNEL RELEASE Right 12/27/2018    CARPAL TUNNEL RELEASE Right 12/27/2018    RELEASE TRANSVERSE CARPAL LIGAMENT RIGHT/MEDIAN NERVE EPINEUROTOMY performed by ALTHEA Tadeo DO at [facility] OR    CORONARY ARTERY BYPASS GRAFT  2014    x6 bypass dr. mojica     NECK SURGERY      cyst, benign    PACEMAKER PLACEMENT  08/2018    [facility]       Current Facility-Administered Medications   Medication Dose Route Frequency Provider Last Rate Last Admin    dofetilide (TIKOSYN) capsule 125 mcg  125 mcg Oral BID James Mojica MD   125 mcg at 12/18/24 1947    sodium chloride flush 0.9 % injection 5-40 mL  5-40 mL IntraVENous 2 times per day Silvia Packer MD   10 mL at 12/18/24 1947    sodium chloride flush 0.9 % injection 5-40 mL  5-40 mL IntraVENous PRN Silvia Packer MD        0.9 % sodium chloride infusion   IntraVENous PRN Silvia Packer MD        potassium chloride (KLOR-CON M) extended release tablet 40 mEq  40 mEq Oral PRN Birdie  signal and lacks relative  decrease signal seen in acute ischemic change.    VASCULAR: Flow voids are symmetric bilaterally.    CALVARIUM: There is normal bone marrow signal present.    EXTRACRANIAL STRUCTURES: The orbits are visualized without  abnormality.  The visualized paranasal sinuses and mastoid air cells  are clear.    Impression  Findings favoring subacute infarct involving the deep white matter  near the left thalamus and posterior limb internal capsule.    These findings were communicated to radiology department personnel by  critical results form for further communication to the requesting  physician at the conclusion of this examination.       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 I think this is a node on the pancreas since there is no vein invasion and primary is more likely a cholangiocarcinoma with mid duct structure. Would be odd for colon to spread to bile duct. We discussed this means surgery is not an option either way and for treatment and he would need chemotherapy  - medical oncology has already been consulted  - Sent tumor markers  - Biopsy of the liver was done during EUS  - Will follow up biopsy results   - Patient will need mediport placement    Thank you for the consultation allowing me to take part in 's care. 60 Minutes of which greater than 50% was spent counseling or coordinating his care.      Chichi Chacko MD

## 2024-12-19 NOTE — PROGRESS NOTES
Primary Care Physician: Ritesh Lugo MD   Admitting Physician:  Akira Clay MD  Admission date and time: 12/16/2024 11:32 PM    Room:  [address] [place] [address]  Admitting diagnosis: Symptomatic cholelithiasis [K80.20]  Common bile duct (CBD) obstruction [K83.1]    Patient Name: Mark Martínez  MRN: [id number]    Date of Service: 12/19/2024     Subjective:  Mark is a 84 y.o. male who was seen and examined today,12/19/2024, at the bedside. Family present during my examination.  Patient is AO x 3 and is feeling tired today.  His INR is within normal limit and plan for ERCP today.  Cardiology adjusted Tikosyn dose today.  Heparin was held for the ERCP today.    Review of System:   Constitutional:   Denies fever or chills, weight loss or gain, fatigue or malaise.positive for generalized weakness, fatigue   HEENT:   Denies ear pain, sore throat, sinus or eye problems.  Cardiovascular:   Denies any chest pain, irregular heartbeats, or palpitations.   Respiratory:   Denies shortness of breath, coughing, sputum production, hemoptysis, or wheezing.  Gastrointestinal:   Denies nausea, vomiting, diarrhea, or constipation.   Positive for slight abdominal pain   Genitourinary:    Denies any urgency, frequency, hematuria. Voiding  without difficulty.  Extremities:   Denies lower extremity swelling, edema or cyanosis.   Neurology:    Denies any headache or focal neurological deficits, Denies generalized weakness or memory difficulty.   Psch:   Denies being anxious or depressed.  Musculoskeletal:    Denies  myalgias, joint complaints or back pain.   Integumentary:   Denies any rashes, ulcers, or excoriations.  Denies bruising. Positive for generalized jaundice,   Hematologic/Lymphatic:  Denies bruising or bleeding.    Physical Exam:  I/O this shift:  In: 0   Out: 300 [Urine:300]    Intake/Output Summary (Last 24 hours) at 12/19/2024 1220  Last data filed at 12/19/2024 1048  Gross per 24 hour   Intake 0 ml   Output 300 ml   Net -300 ml   I/O

## 2024-12-19 NOTE — ANESTHESIA POSTPROCEDURE EVALUATION
Department of Anesthesiology  Postprocedure Note    Patient: Mark Martínez  MRN: [id number]  YOB: 1940  Date of evaluation: 12/19/2024    Procedure Summary       Date: 12/19/24 Room / Location: [address]    Anesthesia Start: 1443 Anesthesia Stop: 1543    Procedures:       ENDOSCOPIC RETROGRADE CHOLANGIOPANCREATOGRAPHY SPHINCTER/PAPILLOTOMY      ESOPHAGOGASTRODUODENOSCOPY ENDOSCOPIC ULTRASOUND FINE NEEDLE ASPIRATION      ENDOSCOPIC RETROGRADE CHOLANGIOPANCREATOGRAPHY STONE REMOVAL      ENDOSCOPIC RETROGRADE CHOLANGIOPANCREATOGRAPHY STENT INSERTION Diagnosis:       Pancreatic mass      (Pancreatic mass [K86.89])    Surgeons: Hamlet Han MD Responsible Provider: Keaton Rice MD    Anesthesia Type: MAC ASA Status: 3            Anesthesia Type: No value filed.    Gay Phase I:      Gay Phase II: Gay Score: 9    Anesthesia Post Evaluation    Patient location during evaluation: PACU  Patient participation: complete - patient participated  Level of consciousness: awake and alert  Airway patency: patent  Nausea & Vomiting: no nausea and no vomiting  Cardiovascular status: hemodynamically stable  Respiratory status: acceptable  Hydration status: euvolemic  Pain management: satisfactory to patient    No notable events documented.

## 2024-12-19 NOTE — PLAN OF CARE
Problem: Discharge Planning  Goal: Discharge to home or other facility with appropriate resources  12/18/2024 2208 by Geoff Tom RN  Outcome: Progressing  12/18/2024 1418 by Liliana Collazo RN  Outcome: Progressing     Problem: Safety - Adult  Goal: Free from fall injury  12/18/2024 2208 by Geoff Tom RN  Outcome: Progressing  12/18/2024 1418 by Liliana Collazo RN  Outcome: Progressing     Problem: ABCDS Injury Assessment  Goal: Absence of physical injury  12/18/2024 2208 by Geoff Tom RN  Outcome: Progressing  12/18/2024 1418 by Liliana Collazo RN  Outcome: Progressing     Problem: Nutrition Deficit:  Goal: Optimize nutritional status  12/18/2024 2208 by Geoff Tom RN  Outcome: Progressing  12/18/2024 1418 by Liliana Collazo RN  Outcome: Progressing     Problem: Skin/Tissue Integrity  Goal: Absence of new skin breakdown  Description: 1.  Monitor for areas of redness and/or skin breakdown  2.  Assess vascular access sites hourly  3.  Every 4-6 hours minimum:  Change oxygen saturation probe site  4.  Every 4-6 hours:  If on nasal continuous positive airway pressure, respiratory therapy assess nares and determine need for appliance change or resting period.  12/18/2024 2208 by Geoff Tom RN  Outcome: Progressing  12/18/2024 1418 by Liliana Collazo RN  Outcome: Progressing

## 2024-12-19 NOTE — PROGRESS NOTES
Physician Progress Note      PATIENT:               IVÁN GUTIERREZ  CSN #:                  [id number]  :                       1940  ADMIT DATE:       2024 11:32 PM  DISCH DATE:  RESPONDING  PROVIDER #:        Silvia Packer MD          QUERY TEXT:    Patient admitted with jaundice and CBD obstruction. Noted to have \"Moderate   malnutrition\" in Dietician Progress Note . If possible, please document   in progress notes and discharge summary if you are evaluating and /or treating   any of the following:    The medical record reflects the following:  Risk Factors:  CBD Obstruction, Advanced Age, Hx of CA s/p sigmoidectomy w/   resection  Clinical Indicators: Dietician Progress Note  \"...Malnutrition   Assessment: Malnutrition Status:  Moderate malnutrition (24 1101)  Context:  Chronic Illness...Findings of the 6 clinical characteristics of   malnutrition: Energy Intake:  75% or less estimated energy requirements for 1   month or longer...Weight Loss:  Greater than 7.5% over 3 months (-15% in four   months)...Body Fat Loss:   (moderate) Orbital, Triceps...Muscle Mass Loss:     (moderate) Temples (temporalis), Clavicles (pectoralis & deltoids), Hand   (interosseous)...Fluid Accumulation: Mild Extremities...\"  Treatment: Dietician Consult, Oral Nutritional Supplement    ASPEN Criteria:    https://aspenjournals.onlinelibrary.leija.com/doi/full/10.1177/143641919023318  5      Thank you,  GREER PeraltaN, RN, CRCR  Clinical Documentation Integrity  [phone]  Options provided:  -- Protein calorie malnutrition moderate  -- Other - I will add my own diagnosis  -- Disagree - Not applicable / Not valid  -- Disagree - Clinically unable to determine / Unknown  -- Refer to Clinical Documentation Reviewer    PROVIDER RESPONSE TEXT:    This patient has moderate protein calorie malnutrition.    Query created by: Lincoln Alcantar on 2024 2:37 PM      Electronically signed by:  Silvia Packer MD  12/19/2024 4:23 PM

## 2024-12-19 NOTE — OP NOTE
Operative Note      Patient: Mark Martínez  YOB: 1940  MRN: [id number]    Date of Procedure: 12/19/2024    Pre-Op Diagnosis Codes:      * Pancreatic mass [K86.89]    Post-Op Diagnosis: Same       Procedure(s):  ENDOSCOPIC RETROGRADE CHOLANGIOPANCREATOGRAPHY SPHINCTER/PAPILLOTOMY  ESOPHAGOGASTRODUODENOSCOPY ENDOSCOPIC ULTRASOUND FINE NEEDLE ASPIRATION  ENDOSCOPIC RETROGRADE CHOLANGIOPANCREATOGRAPHY STONE REMOVAL  ENDOSCOPIC RETROGRADE CHOLANGIOPANCREATOGRAPHY STENT INSERTION    Surgeon(s):  Hamlet Han MD    Assistant:   Surgical Assistant: Heidi Beltran    Anesthesia: Monitor Anesthesia Care    Estimated Blood Loss (mL): less than 50     Complications: None    Specimens:   ID Type Source Tests Collected by Time Destination   A : liver mass FNA Tissue Fine Needle Aspirate SURGICAL PATHOLOGY Hamlet Han MD 12/19/2024 1519    B : liver mass FNA Tissue Fine Needle Aspirate CYTOLOGY, NON-GYN Hamlet Han MD 12/19/2024 1524    C : pancreatic mass FNA Tissue Fine Needle Aspirate SURGICAL PATHOLOGY Hamlet Han MD 12/19/2024 1524    D : pancreatic mass FNA Tissue Fine Needle Aspirate CYTOLOGY, NON-GYN Hamlet Han MD 12/19/2024 1526        Implants:  Implant Name Type Inv. Item Serial No.  Lot No. LRB No. Used Action   STENT BILI L60MM [id number] CATH 8.5FR L194CM GWIRE 0.035IN - [id number]  STENT BILI L60MM [id number] CATH 8.5FR L194CM GWIRE 0.035IN  [employer]- [id number] N/A 1 Implanted         Drains: * No LDAs found *    Findings:  Infection Present At Time Of Surgery (PATOS) (choose all levels that have infection present):  No infection present  Other Findings: see below    Detailed Description of Procedure:     Indications and History:  The patient is a 84 y.o. male.  The risks, benefits, complications, treatment options and expected outcomes were discussed with the patient.  The possibilities of reaction to medication, pulmonary aspiration, perforation of  the gastrointestinal tract, bleeding requiring transfusion or operation, respiratory failure requiring placement on a ventilator and failure to diagnose a condition were discussed with the patient who freely signed the consent.      Description of Procedure:  The patient was taken to the endoscopy suite, identified as Mark Martínez and the procedure verified as Endoscopic Ultrasound (EUS).  A Time Out was held and the above information confirmed. The patient was positioned in the left lateral position with an oral bite block and anesthesia was provided for sedation and comfort.      The echoendoscope was passed to the duodenal bulb.    EGD/EUS findings:   Esophagus: normal   Stomach: Some evidence of pyloric stenosis with difficulty passing the scope into the duodenum   Duodenum: normal   Pancreas: 2.4 cm well-circumscribed hypoechoic mass in the pancreatic head abutting the portal vein, pancreatic neoplasm is favored however the well-circumscribed nature of the lesion could also be consistent with a large intrapancreatic or peripancreatic lymph node.  Of note, there is no dilation of the pancreatic duct throughout the head, body, tail of the pancreas.  The pancreatic parenchyma is otherwise normal.  The pancreatic lesion was biopsied with a 22-gauge FNB needle and 2 passes.   Liver: Multiple heterogeneous hypoechoic masses noted in the liver.  1 of those masses was biopsied with a 19-gauge FNB needle and 2 passes.   Bile Duct: Dilated common hepatic duct with mid to distal common bile duct narrowing.   Gallbladder: Distended and filled with hypoechoic debris      Specimens:  FNB liver mass  FNB pancreatic mass      ERCP:    Next, the flexible duodenoscope was inserted down  the oropharynx and passed down the esophagus into the stomach and into the  duodenum. The papilla was identified.  Next, using a sphincterotome with a Jagwire, I cannulated the common bile duct.  Contrast injection revealed adequate cannulation of

## 2024-12-19 NOTE — CONSULTS
[address] [address]                              CONSULTATION      PATIENT NAME: IVÁN GUTIERREZ                  : 1940  MED REC NO: [id number]                        ROOM: [id number]  ACCOUNT NO: [id number]                       ADMIT DATE: 2024  PROVIDER: Mark Calderón      CONSULT DATE: 2024    REFERRING PHYSICIAN:  LEIGHANN CLAY    PMD:  Dr. Clay.    REASON FOR CONSULTATION:  Obstructive jaundice with elevated liver enzymes, history of colon cancer.    HISTORY OF PRESENT ILLNESS:  The patient is an 84-year-old male, known to have past medical history of coronary artery disease, status post CABG; irregular heartbeat; atrial fibrillation, status post pacemaker placement, on Eliquis; peripheral vascular disease, status post carotid stent placement; history of colon cancer, status post sigmoidectomy 3 years ago at the [facility].  The patient did not follow up after his surgery.  He was diagnosed by Dr. Sims in  for his sigmoid colon cancer.  After his surgery, he did not follow up for colonoscopies.  He reported he does not remember if I was offered chemotherapy at the [facility] and he reported that most likely he was offered followup, but he did not with the [facility].  The patient is also known to have history of prostate cancer and skin cancer.  He was admitted to the hospital for painless jaundice, transferred from [place] for possible ERCP and liver lesion biopsies.  The patient has been jaundiced for 1 month, being evaluated by his primary doctor as an outpatient.  CAT scan was done outpatient, showed liver metastasis, was scheduled to have liver lesions biopsies at [facility], but he was having skin itching, admitted to [place] and transferred here to [place] for ERCP supposed to be today, but it was canceled due to his INR was elevated.  The patient denies any abdominal  tenderness.  SKIN:  Jaundiced.  No ecchymosis, cyanosis, or clubbing.  EXTREMITIES:  No edema.  Positive pulse.    ASSESSMENT AND PLAN:  84-year-old male admitted to the hospital with painless jaundice, elevated total bilirubin and LFTs, found to have abnormal CT of the abdomen with mass 2.4 cm in the head of the pancreas with liver metastasis, most likely patient has primary pancreatic cancer with metastasis.  The patient is scheduled for ERCP and EUS tomorrow by Dr. Han with possible liver lesions biopsy, stent placement of the common bile duct.  He is known to have colon cancer, underwent sigmoidectomy in 2020 at the [facility].  He did not follow up after that at the clinic or locally with his GI doctor, Dr. Sims.  The patient was instructed after this episode and after draining his common bile duct to follow up as an outpatient with Dr. Sims.  Oncology consultation is already obtained.  I would avoid any hepatotoxic drugs at this time, including his cholesterol medicine until his liver enzymes improve.    Thank you for consultation and allowing me taking care of your patient.  Please feel free to call me if you have any questions.          ANTONI BAXTER      D:  12/18/2024 17:55:51     T:  12/18/2024 21:36:37     FABIO/GRECIA  Job #:  [id number]     Doc#:  [id number]

## 2024-12-20 PROBLEM — C78.7 METASTATIC CANCER TO LIVER (HCC): Status: ACTIVE | Noted: 2024-01-01

## 2024-12-20 PROBLEM — K83.1 OBSTRUCTIVE JAUNDICE DUE TO MALIGNANT NEOPLASM (HCC): Status: ACTIVE | Noted: 2024-01-01

## 2024-12-20 PROBLEM — C80.1 OBSTRUCTIVE JAUNDICE DUE TO MALIGNANT NEOPLASM (HCC): Status: ACTIVE | Noted: 2024-12-20

## 2024-12-20 NOTE — PROGRESS NOTES
Primary Care Physician: Ritesh Lugo MD   Admitting Physician:  Akira Clay MD  Admission date and time: 12/16/2024 11:32 PM    Room:  [address] [place] [address]  Admitting diagnosis: Symptomatic cholelithiasis [K80.20]  Common bile duct (CBD) obstruction [K83.1]    Patient Name: Mark Martínez  MRN: [id number]    Date of Service: 12/20/2024     Subjective:  Mark is a 84 y.o. male who was seen and examined today,12/20/2024, at the bedside. Family present during my examination.  Patient is AO x 3 and is feeling tired today.  Patient is still down due to recent diagnosis of metastatic cancer.  He had ERCP with EUS biopsy yesterday and tolerated the procedure well.  We stopped heparin drip and started on full dose Lovenox today.  Patient refused Mediport and stated that he needs some time to process everything and want to get Mediport after he gets biopsy results.  He is denying any chest pain, shortness of breath but states that he is winded down and is feeling overall weak.  His bilirubin remains elevated, but trending LFTs.    Review of System:   Constitutional:   Denies fever or chills, weight loss or gain, fatigue or malaise.positive for generalized weakness, fatigue   HEENT:   Denies ear pain, sore throat, sinus or eye problems.  Cardiovascular:   Denies any chest pain, irregular heartbeats, or palpitations.   Respiratory:   Denies shortness of breath, coughing, sputum production, hemoptysis, or wheezing.  Gastrointestinal:   Denies nausea, vomiting, diarrhea, or constipation.   Positive for slight abdominal pain   Genitourinary:    Denies any urgency, frequency, hematuria. Voiding  without difficulty.  Extremities:   Denies lower extremity swelling, edema or cyanosis.   Neurology:    Denies any headache or focal neurological deficits, Denies generalized weakness or memory difficulty.   Psch:   Denies being anxious or depressed.  Musculoskeletal:    Denies  myalgias, joint complaints or back pain.   Integumentary:   Denies  Abdominal and pelvic ascites.   5. Cholelithiasis.   6. Nonobstructive 9 mm calculus in the right kidney.   7. Sigmoid diverticulosis without diverticulitis.             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  Cholelithiasis  Dark stools/anemia  Urinary tract infection  Elevated INR  History of A fib  History of colon cancer s/p sigmoidectomy/high anterior resection 11/10/20 ([facility]), pathology consistent with invasive moderately differentiated colonic adenocarcinoma with no positive lymph nodes  History of prostate cancer treated with radiation and surgery  History of basal cell carcinoma of scalp s/p excision  History of CAD s/p CABG x6, pacemaker placement (2021)     Plan:   Painless jaundice  CBD obstruction secondary to compressive metastasis of unknown primary origin/possible primary pancreatic carcinoma  Cholelithiasis  -CT abdomen/pelvis done at [facility] showed 2 ill-defined lesion located at the left hepatic lobe, small volume collection, mild to moderate dilation of the intrahepatic ducts, multiple 6 mm solid appearing nodules in the right lower lobe.  Gallbladder mildly distended.  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  -CT abdomen pelvis done on 12/17/2024 showed  2.4 cm hypodense mass in the region of the pancreatic neck likely reflecting primary pancreatic neoplasm. Hepatic metastatic disease. Metastatic peripancreatic and periportal lymph nodes. Abdominal and pelvic ascites. Cholelithiasis.  -Total bilirubin and direct bilirubin elevated; still trending up  -ERCP with EUS biopsy of liver mass and pancreatic mass was done on 12/19/2024; pointing towards cholangiocarcinoma  -On Cholestyramine

## 2024-12-20 NOTE — PLAN OF CARE
Problem: Discharge Planning  Goal: Discharge to home or other facility with appropriate resources  Outcome: Progressing     Problem: Safety - Adult  Goal: Free from fall injury  Outcome: Progressing     Problem: ABCDS Injury Assessment  Goal: Absence of physical injury  Outcome: Progressing     Problem: Nutrition Deficit:  Goal: Optimize nutritional status  Outcome: Progressing     Problem: Skin/Tissue Integrity  Goal: Absence of new skin breakdown  Description: 1.  Monitor for areas of redness and/or skin breakdown  2.  Assess vascular access sites hourly  3.  Every 4-6 hours minimum:  Change oxygen saturation probe site  4.  Every 4-6 hours:  If on nasal continuous positive airway pressure, respiratory therapy assess nares and determine need for appliance change or resting period.  Outcome: Progressing

## 2024-12-20 NOTE — PROGRESS NOTES
GENERAL SURGERY  DAILY PROGRESS NOTE  12/20/2024    CC: Obstructive jaundice      Subjective:  Patient is status post ERCP with stent placement.  Patient's had no complaints overnight.  Tolerating diet    Objective:  /77   Pulse 59   Temp 97.9 °F (36.6 °C) (Oral)   Resp 18   Ht 1.803 m (5' 10.98\")   Wt 72.6 kg (160 lb)   SpO2 98%   BMI 22.33 kg/m²     GENERAL:  Laying in bed, awake, alert, cooperative, no apparent distress, remains jaundice  HEAD: Normocephalic, atraumatic  EYES: Sclera icterus  LUNGS:  No increased work of breathing  CARDIOVASCULAR:  RR  ABDOMEN:  Soft, non-tender, non-distended jaundice  EXTREMITIES: No edema or swelling  SKIN: Warm and dry    Assessment/Plan:  84 y.o. male instructed jaundice, concern for cholangiocarcinoma s/p ERCP with biopsy and stent placement    Continue to closely monitor bilirubin, bilirubin remains elevated but most likely will start to downtrend since ERCP was performed yesterday  Await biopsies  Continue regular diet  Okay to start heparin drip, will defer to primary and cardiology service as far as patient continuing on heparin drip versus therapeutic Lovenox versus restarting patient on Eliquis.    Appreciate hepatobiliary recommendations    Electronically signed by Ludmila Baker DO on 12/20/2024 at 9:17 AM

## 2024-12-20 NOTE — PROGRESS NOTES
Hepatobiliary and Pancreatic Surgery Progress Note    CC: Obstructive jaundice due to malignant neoplasm    Subjective: Patient states that he is feeling better.  He did have some epigastric abdominal pain.  He had a an ERCP with an endoscopic ultrasound yesterday.    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 Chacko    Thank you for the consultation and allowing me to take part in Mr. Martínez' care.      Negro Andrea MD 12/20/2024 6:42 AM

## 2024-12-20 NOTE — PROGRESS NOTES
Subjective:    The patient is awake and alert, daughter at the bedside. He tolerated his procedure well, has some abdominal discomfort. Denies n/v/d, tolerating food well. Is hoping to be able to go home soon.      Objective:    /77   Pulse 59   Temp 97.9 °F (36.6 °C) (Oral)   Resp 18   Ht 1.803 m (5' 10.98\")   Wt 72.6 kg (160 lb)   SpO2 98%   BMI 22.33 kg/m²     General: Alert, oriented, no acute distress  HEENT: No thrush or mucositis, EOMI, PERRLA  Heart:  RRR, no murmurs, gallops, or rubs.  Lungs:  CTA bilaterally, no wheeze, rales or rhonchi  Abd: BS present, nontender, nondistended, no masses  Extrem:  No clubbing, cyanosis, or edema  Lymphatics: No palpable adenopathy in cervical and supraclavicular regions  Skin: Intact, no petechia or purpura. Jaundice    CBC with Differential:    Lab Results   Component Value Date/Time    WBC 10.7 12/20/2024 04:52 AM    RBC 3.80 12/20/2024 04:52 AM    HGB 11.2 12/20/2024 04:52 AM    HCT 32.0 12/20/2024 04:52 AM     12/20/2024 04:52 AM    MCV 84.2 12/20/2024 04:52 AM    MCH 29.5 12/20/2024 04:52 AM    MCHC 35.0 12/20/2024 04:52 AM    RDW 21.8 12/20/2024 04:52 AM    METASPCT 2 12/18/2024 03:39 AM    LYMPHOPCT 4 12/20/2024 04:52 AM    MONOPCT 1 12/20/2024 04:52 AM    MYELOPCT 1 12/19/2024 05:38 AM    EOSPCT 4 12/20/2024 04:52 AM    BASOPCT 1 12/20/2024 04:52 AM    MONOSABS 0.09 12/20/2024 04:52 AM    LYMPHSABS 0.38 12/20/2024 04:52 AM    EOSABS 0.38 12/20/2024 04:52 AM    BASOSABS 0.09 12/20/2024 04:52 AM     CMP:    Lab Results   Component Value Date/Time     12/20/2024 04:52 AM    K 3.6 12/20/2024 04:52 AM     12/20/2024 04:52 AM    CO2 20 12/20/2024 04:52 AM    BUN 22 12/20/2024 04:52 AM    CREATININE 1.0 12/20/2024 04:52 AM    LABGLOM 75 12/20/2024 04:52 AM    GLUCOSE 105 12/20/2024 04:52 AM    CALCIUM 9.1 12/20/2024 04:52 AM    BILITOT 28.2 12/20/2024 04:52 AM    ALKPHOS 330 12/20/2024 04:52 AM    AST 67 12/20/2024 04:52 AM    ALT 47  12/20/2024 04:52 AM              Current Facility-Administered Medications:     heparin 25,000 units in dextrose 5% 250 mL (premix) infusion, 5-30 Units/kg/hr, IntraVENous, Continuous, Ludmila Baker DO, Last Rate: 8.7 mL/hr at 12/20/24 0628, 12 Units/kg/hr at 12/20/24 0628    dofetilide (TIKOSYN) capsule 125 mcg, 125 mcg, Oral, BID, James Mojica MD, 125 mcg at 12/19/24 1932    sodium chloride flush 0.9 % injection 5-40 mL, 5-40 mL, IntraVENous, 2 times per day, Silvia Packer MD, 10 mL at 12/19/24 1933    sodium chloride flush 0.9 % injection 5-40 mL, 5-40 mL, IntraVENous, PRN, Silvia Packer MD    0.9 % sodium chloride infusion, , IntraVENous, PRN, Silvia Packer MD    potassium chloride (KLOR-CON M) extended release tablet 40 mEq, 40 mEq, Oral, PRN **OR** potassium bicarb-citric acid (EFFER-K) effervescent tablet 40 mEq, 40 mEq, Oral, PRN **OR** potassium chloride 10 mEq/100 mL IVPB (Peripheral Line), 10 mEq, IntraVENous, PRN, Silvia Packer MD    magnesium sulfate 2000 mg in 50 mL IVPB premix, 2,000 mg, IntraVENous, PRN, Silvia Packer MD    ondansetron (ZOFRAN-ODT) disintegrating tablet 4 mg, 4 mg, Oral, Q8H PRN **OR** [DISCONTINUED] ondansetron (ZOFRAN) injection 4 mg, 4 mg, IntraVENous, Q6H PRN, Silvia Packer MD    polyethylene glycol (GLYCOLAX) packet 17 g, 17 g, Oral, Daily PRN, Silvia Packer MD    acetaminophen (TYLENOL) tablet 650 mg, 650 mg, Oral, Q6H PRN **OR** acetaminophen (TYLENOL) suppository 650 mg, 650 mg, Rectal, Q6H PRN, Silvia Packer MD    cholestyramine (QUESTRAN) packet 4 g, 1 packet, Oral, BID, Silvia Packer MD, 4 g at 12/19/24 1932    metoprolol tartrate (LOPRESSOR) tablet 100 mg, 100 mg, Oral, BID, Silvia Packer MD, 100 mg at 12/19/24 1933    heparin (porcine) injection 4,000 Units, 4,000 Units, IntraVENous, PRN, Silvia Packer MD    heparin (porcine) injection 2,000 Units, 2,000 Units, IntraVENous, PRN, Silvia Packer MD, 2,000 Units at 12/19/24

## 2024-12-20 NOTE — ACP (ADVANCE CARE PLANNING)
Advance Care Planning   Healthcare Decision Maker:    Primary Decision Maker: Preethi Dominguez - Child - [phone]

## 2024-12-20 NOTE — PROGRESS NOTES
Comprehensive Nutrition Assessment    Type and Reason for Visit:  Reassess    Nutrition Recommendations/Plan:   Continue Current Diet (Regular- low fat)  Continue ONS (high montserrat/high protein) TID  Consult RD as needed      Malnutrition Assessment:  Malnutrition Status:  Moderate malnutrition (12/17/24 1101)    Context:  Chronic Illness     Findings of the 6 clinical characteristics of malnutrition:  Energy Intake:  75% or less estimated energy requirements for 1 month or longer  Weight Loss:  Greater than 7.5% over 3 months (-15% in four months)     Body Fat Loss:   (moderate) Orbital, Triceps   Muscle Mass Loss:   (moderate) Temples (temporalis), Clavicles (pectoralis & deltoids), Hand (interosseous)  Fluid Accumulation:  Mild Extremities    Nutrition Assessment:    Pt admit w/ symptomatic cholelithiasis, painless jaundice, CBD obstruction. PMHx: Colon Cancer s/p sigmoidectomy w/ resection 11/10/24 at [facility]. Pt for possible ERCP 12/18/24. PMHx: CAD, CABG x 6, Pacemaker (2021), Colon and Prostate Cancer. Pt report 50lb weight loss in the past six months d/t cancer/treatment. Pt w/ poor po intake prior to admission. Will provide ONS, continue inpatient monitoring, f/up per policy. 12/20/24: F/up: Pt s/p pancreatic/liver biopsies, ERCP w/ stent. Biposies pending, however pt instructed to follow at the cancer center as an outpatient. Pt reports improved appetite, eating % of all meals, and 50% ONS. Continue current diet and ONS regimen, f/up per policy.    Nutrition Related Findings:    A/O x4, I/O -1025 since admit, abd wdl, +BS, Transaminitis, Bilirubin 28.2 Wound Type: None       Current Nutrition Intake & Therapies:    Average Meal Intake: %  Average Supplements Intake: 26-50%  ADULT DIET; Regular; Low Fat (less than or equal to 50 gm/day)  ADULT ORAL NUTRITION SUPPLEMENT; Breakfast, Lunch, Dinner; Standard High Calorie/High Protein Oral Supplement    Anthropometric Measures:  Height: 180.3 cm (5'  Picked it up already.

## 2024-12-20 NOTE — PROGRESS NOTES
Cardiology  Progress Note      SUBJECTIVE:  No chest pain. No dyspnea. Feels very weak.   Complaining of itchiness all over the body.    Current Inpatient Medications  Current Facility-Administered Medications: heparin 25,000 units in dextrose 5% 250 mL (premix) infusion, 5-30 Units/kg/hr, IntraVENous, Continuous  dofetilide (TIKOSYN) capsule 125 mcg, 125 mcg, Oral, BID  sodium chloride flush 0.9 % injection 5-40 mL, 5-40 mL, IntraVENous, 2 times per day  sodium chloride flush 0.9 % injection 5-40 mL, 5-40 mL, IntraVENous, PRN  0.9 % sodium chloride infusion, , IntraVENous, PRN  potassium chloride (KLOR-CON M) extended release tablet 40 mEq, 40 mEq, Oral, PRN **OR** potassium bicarb-citric acid (EFFER-K) effervescent tablet 40 mEq, 40 mEq, Oral, PRN **OR** potassium chloride 10 mEq/100 mL IVPB (Peripheral Line), 10 mEq, IntraVENous, PRN  magnesium sulfate 2000 mg in 50 mL IVPB premix, 2,000 mg, IntraVENous, PRN  ondansetron (ZOFRAN-ODT) disintegrating tablet 4 mg, 4 mg, Oral, Q8H PRN **OR** [DISCONTINUED] ondansetron (ZOFRAN) injection 4 mg, 4 mg, IntraVENous, Q6H PRN  polyethylene glycol (GLYCOLAX) packet 17 g, 17 g, Oral, Daily PRN  acetaminophen (TYLENOL) tablet 650 mg, 650 mg, Oral, Q6H PRN **OR** acetaminophen (TYLENOL) suppository 650 mg, 650 mg, Rectal, Q6H PRN  cholestyramine (QUESTRAN) packet 4 g, 1 packet, Oral, BID  metoprolol tartrate (LOPRESSOR) tablet 100 mg, 100 mg, Oral, BID  heparin (porcine) injection 4,000 Units, 4,000 Units, IntraVENous, PRN  heparin (porcine) injection 2,000 Units, 2,000 Units, IntraVENous, PRN  morphine (PF) injection 2 mg, 2 mg, IntraVENous, Q4H PRN  pantoprazole (PROTONIX) 40 mg in sodium chloride (PF) 0.9 % 10 mL injection, 40 mg, IntraVENous, Q6H      Physical  VITALS:  /77   Pulse 59   Temp 97.9 °F (36.6 °C) (Oral)   Resp 18   Ht 1.803 m (5' 10.98\")   Wt 72.6 kg (160 lb)   SpO2 98%   BMI 22.33 kg/m²   CURRENT TEMPERATURE:  Temp: 97.9 °F (36.6  °C)  CONSTITUTIONAL: No acute distress.  EYES: Vision is intact.  ENT: No sore throat.  No ear drainage.  NECK: No JVD.  BACK: Symmetric.  LUNGS:  diminished breath sounds right base and left base  CARDIOVASCULAR:  normal S1 and S2, no S3, and no S4  ABDOMEN:  non-tender  NEUROLOGIC: No focal deficits.    EXTREMITIES: No edema cyanosis or clubbing.    DATA:        Cardiology Labs:    Lab Results   Component Value Date/Time     12/20/2024 04:52 AM    K 3.6 12/20/2024 04:52 AM     12/20/2024 04:52 AM    CO2 20 12/20/2024 04:52 AM    BUN 22 12/20/2024 04:52 AM    CREATININE 1.0 12/20/2024 04:52 AM    GLUCOSE 105 12/20/2024 04:52 AM    CALCIUM 9.1 12/20/2024 04:52 AM    LABGLOM 75 12/20/2024 04:52 AM      CBC:    Recent Labs     12/20/24  0452 12/19/24  2342 12/19/24  1733 12/19/24  0538 12/18/24  0901 12/18/24  0339   WBC 10.7  --   --  8.2  --  7.8   HGB 11.2* 11.8* 12.1* 10.8*   < > 11.4*  11.5*   HCT 32.0* 33.9* 35.0* 31.0*   < > 31.7*  32.0*   MCV 84.2  --   --  82.9  --  82.1     --   --  233  --  230    < > = values in this interval not displayed.     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 Status post cardioversion 2021.  Coronary artery disease.  Status post CABG 2014.  Sick sinus syndrome.  Status post permanent pacemaker 2018.  History of colon cancer s/p sigmoidectomy/high anterior resection 11/10/20 ([facility]),     Patient is found to have severe jaundice recently and he is being evaluated for that.    High suspicion for cancer with metastasis mostly from pancreatic origin    Patient underwent ERCP With stent insertion.    The surgeon indicated that patient can go back on Eliquis.    He was taking Eliquis and Tikosyn prior to this admission for paroxysmal

## 2024-12-20 NOTE — CARE COORDINATION
12/20/2024 1735 CM Note: Pt resides alone in a ranch style home.  Pt has a cane and a walker that he uses as needed.  Pt is independent and drives but doesn't do much cooking his dtr brings him food. No history of HHC and doesn't want HHC now, no history of SNF.  Pt's daughter voiced concerns about pt not doing that well at home but states he won't allow anyone to come into his house.PCP is Dr Lugo and uses [facility] on [address]  CM will follow. Electronically signed by Micheline Michelle RN on 12/20/2024 at 5:52 PM

## 2024-12-20 NOTE — PROGRESS NOTES
Physical Therapy    Room #:   0623/0623-02    Date: 2024       Patient Name: Mark Martínez  : 1940      MRN: [id number]     Patient unavailable for physical therapy treatment due to refusal. States he has been up and down from the restroom all day and is tired and just wants to take a nap. Will attempt PT treatment at a later time or date. Thank you.      Hellen Mg, PTA

## 2024-12-20 NOTE — PLAN OF CARE
Problem: Discharge Planning  Goal: Discharge to home or other facility with appropriate resources  12/20/2024 0032 by Andra Rubin RN  Outcome: Progressing  12/19/2024 2004 by Geoff Tom RN  Outcome: Progressing     Problem: Safety - Adult  Goal: Free from fall injury  12/20/2024 0032 by Andra Rubin RN  Outcome: Progressing  12/19/2024 2004 by Geoff Tom RN  Outcome: Progressing     Problem: ABCDS Injury Assessment  Goal: Absence of physical injury  12/20/2024 0032 by Andra Rubin RN  Outcome: Progressing  12/19/2024 2004 by Geoff Tom RN  Outcome: Progressing     Problem: Nutrition Deficit:  Goal: Optimize nutritional status  12/20/2024 0032 by Andra Rubin RN  Outcome: Progressing  12/19/2024 2004 by Geoff Tom RN  Outcome: Progressing     Problem: Skin/Tissue Integrity  Goal: Absence of new skin breakdown  Description: 1.  Monitor for areas of redness and/or skin breakdown  2.  Assess vascular access sites hourly  3.  Every 4-6 hours minimum:  Change oxygen saturation probe site  4.  Every 4-6 hours:  If on nasal continuous positive airway pressure, respiratory therapy assess nares and determine need for appliance change or resting period.  12/20/2024 0032 by Andra Rubin RN  Outcome: Progressing  12/19/2024 2004 by Geoff Tom RN  Outcome: Progressing

## 2024-12-20 NOTE — PROGRESS NOTES
Spiritual Health History and Assessment/Progress Note  [facility]    Initial Encounter, Spiritual/Emotional Needs,  ,  ,      Name: Mark Martínez MRN: [id number]    Age: 84 y.o.     Sex: male   Language: English   Yarsanism: None   Symptomatic cholelithiasis     Date: 12/20/2024                           Spiritual Assessment began in [facility]        Referral/Consult From: Rounding   Encounter Overview/Reason: Initial Encounter, Spiritual/Emotional Needs  Service Provided For: Patient    Angelina, Belief, Meaning:   Patient identifies as spiritual  Family/Friends No family/friends present      Importance and Influence:  Patient has spiritual/personal beliefs that influence decisions regarding their health  Family/Friends No family/friends present    Community:  Patient feels well-supported. Support system includes: Children  Family/Friends No family/friends present    Assessment and Plan of Care:     Patient Interventions include: Facilitated expression of thoughts and feelings  Family/Friends Interventions include: No family/friends present    Patient Plan of Care: Spiritual Care available upon further referral  Family/Friends Plan of Care: No family/friends present    Electronically signed by Chaplain Janice on 12/20/2024 at 3:09 PM

## 2024-12-21 NOTE — PROGRESS NOTES
General Surgery Progress Note  [facility]    Patient's Name/Date of Birth: Mark Martínez / 1940    Date: December 21, 2024     Surgeon: MD Guille    Chief Complaint: Obstructive jaundice    Patient Active Problem List   Diagnosis    Carpal tunnel syndrome of right wrist    Symptomatic cholelithiasis    Common bile duct (CBD) obstruction    Moderate protein-calorie malnutrition (HCC)    Metastatic cancer to liver (HCC)    Obstructive jaundice due to malignant neoplasm (HCC)       Subjective: Patient feels somewhat better today though a little more winded according to his family.  He denies any abdominal pain.  His bilirubin continues to rise.    Objective:  /60   Pulse 59   Temp 97.7 °F (36.5 °C) (Oral)   Resp 20   Ht 1.803 m (5' 10.98\")   Wt 72.6 kg (160 lb)   SpO2 98%   BMI 22.33 kg/m²   Labs:  Recent Labs     12/19/24  0538 12/19/24  1733 12/20/24  0452 12/20/24  1129 12/20/24  1807 12/21/24  0003 12/21/24  0539   WBC 8.2  --  10.7  --   --   --  9.7   HGB 10.8*   < > 11.2*   < > 11.2* 10.9* 10.9*   HCT 31.0*   < > 32.0*   < > 32.6* 30.8* 30.4*    < > = values in this interval not displayed.     Lab Results   Component Value Date    CREATININE 1.0 12/20/2024    BUN 22 12/20/2024     12/20/2024    K 3.6 12/20/2024     (H) 12/20/2024    CO2 20 (L) 12/20/2024     No results for input(s): \"LIPASE\", \"AMYLASE\" in the last 72 hours.  CBC with Differential:    Lab Results   Component Value Date/Time    WBC 9.7 12/21/2024 05:39 AM    RBC 3.67 12/21/2024 05:39 AM    HGB 10.9 12/21/2024 05:39 AM    HCT 30.4 12/21/2024 05:39 AM     12/21/2024 05:39 AM    MCV 82.8 12/21/2024 05:39 AM    MCH 29.7 12/21/2024 05:39 AM    MCHC 35.9 12/21/2024 05:39 AM    RDW 21.5 12/21/2024 05:39 AM    METASPCT 2 12/18/2024 03:39 AM    LYMPHOPCT 3 12/21/2024 05:39 AM    MONOPCT 5 12/21/2024 05:39 AM    MYELOPCT 1 12/21/2024 05:39 AM    EOSPCT 2 12/21/2024 05:39 AM    BASOPCT 0 12/21/2024 05:39 AM

## 2024-12-21 NOTE — PLAN OF CARE
Problem: Discharge Planning  Goal: Discharge to home or other facility with appropriate resources  Outcome: Progressing     Problem: Safety - Adult  Goal: Free from fall injury  Outcome: Progressing     Problem: ABCDS Injury Assessment  Goal: Absence of physical injury  Outcome: Progressing     Problem: Nutrition Deficit:  Goal: Optimize nutritional status  12/20/2024 2231 by Tyler Worthy RN  Outcome: Progressing  12/20/2024 1243 by Alka Thomas, PENELOPE, LD  Outcome: Progressing  Flowsheets (Taken 12/20/2024 1236)  Nutrient intake appropriate for improving, restoring, or maintaining nutritional needs:   Assess nutritional status and recommend course of action   Monitor oral intake, labs, and treatment plans   Order, calculate, and assess calorie counts as needed   Recommend appropriate diets, oral nutritional supplements, and vitamin/mineral supplements     Problem: Skin/Tissue Integrity  Goal: Absence of new skin breakdown  Description: 1.  Monitor for areas of redness and/or skin breakdown  2.  Assess vascular access sites hourly  3.  Every 4-6 hours minimum:  Change oxygen saturation probe site  4.  Every 4-6 hours:  If on nasal continuous positive airway pressure, respiratory therapy assess nares and determine need for appliance change or resting period.  Outcome: Progressing

## 2024-12-21 NOTE — PROGRESS NOTES
Cardiology  Progress Note      SUBJECTIVE: Patient was up in bed when I came to see him.  He looked very weak.  No acute distress    Current Inpatient Medications  Current Facility-Administered Medications: cefTRIAXone (ROCEPHIN) 1,000 mg in sterile water 10 mL IV syringe, 1,000 mg, IntraVENous, Q24H  enoxaparin (LOVENOX) injection 70 mg, 1 mg/kg, SubCUTAneous, BID  dofetilide (TIKOSYN) capsule 125 mcg, 125 mcg, Oral, BID  sodium chloride flush 0.9 % injection 5-40 mL, 5-40 mL, IntraVENous, 2 times per day  sodium chloride flush 0.9 % injection 5-40 mL, 5-40 mL, IntraVENous, PRN  0.9 % sodium chloride infusion, , IntraVENous, PRN  potassium chloride (KLOR-CON M) extended release tablet 40 mEq, 40 mEq, Oral, PRN **OR** potassium bicarb-citric acid (EFFER-K) effervescent tablet 40 mEq, 40 mEq, Oral, PRN **OR** potassium chloride 10 mEq/100 mL IVPB (Peripheral Line), 10 mEq, IntraVENous, PRN  magnesium sulfate 2000 mg in 50 mL IVPB premix, 2,000 mg, IntraVENous, PRN  ondansetron (ZOFRAN-ODT) disintegrating tablet 4 mg, 4 mg, Oral, Q8H PRN **OR** [DISCONTINUED] ondansetron (ZOFRAN) injection 4 mg, 4 mg, IntraVENous, Q6H PRN  polyethylene glycol (GLYCOLAX) packet 17 g, 17 g, Oral, Daily PRN  acetaminophen (TYLENOL) tablet 650 mg, 650 mg, Oral, Q6H PRN **OR** acetaminophen (TYLENOL) suppository 650 mg, 650 mg, Rectal, Q6H PRN  cholestyramine (QUESTRAN) packet 4 g, 1 packet, Oral, BID  metoprolol tartrate (LOPRESSOR) tablet 100 mg, 100 mg, Oral, BID  morphine (PF) injection 2 mg, 2 mg, IntraVENous, Q4H PRN  pantoprazole (PROTONIX) 40 mg in sodium chloride (PF) 0.9 % 10 mL injection, 40 mg, IntraVENous, Q6H      Physical  VITALS:  BP 95/68   Pulse 62   Temp 97.7 °F (36.5 °C) (Oral)   Resp 18   Ht 1.803 m (5' 10.98\")   Wt 72.6 kg (160 lb)   SpO2 98%   BMI 22.33 kg/m²   CURRENT TEMPERATURE:  Temp: 97.7 °F (36.5 °C)  CONSTITUTIONAL: No acute distress.  EYES: Vision is intact.  ENT: No sore throat.  No ear

## 2024-12-21 NOTE — PROGRESS NOTES
Subjective:    The patient is awake and alert, daughter at the bedside. He has some abdominal discomfort. Denies n/v/d, tolerating food well. Is hoping to be able to go home soon.      Objective:    BP 97/62   Pulse 62   Temp 97.5 °F (36.4 °C)   Resp 18   Ht 1.803 m (5' 10.98\")   Wt 72.6 kg (160 lb)   SpO2 99%   BMI 22.33 kg/m²     General: Alert, oriented, no acute distress  HEENT: No thrush or mucositis, EOMI  Heart:  RRR, no murmurs, gallops, or rubs.  Lungs:  CTA bilaterally, no wheeze, rales or rhonchi  Abd: BS present, nontender, nondistended, no masses  Extrem:  No clubbing, cyanosis, or edema  Lymphatics: No palpable adenopathy in cervical and supraclavicular regions  Skin: Intact, no petechia or purpura. Jaundice    CBC with Differential:    Lab Results   Component Value Date/Time    WBC 9.7 12/21/2024 05:39 AM    RBC 3.67 12/21/2024 05:39 AM    HGB 10.9 12/21/2024 05:39 AM    HCT 30.4 12/21/2024 05:39 AM     12/21/2024 05:39 AM    MCV 82.8 12/21/2024 05:39 AM    MCH 29.7 12/21/2024 05:39 AM    MCHC 35.9 12/21/2024 05:39 AM    RDW 21.5 12/21/2024 05:39 AM    METASPCT 2 12/18/2024 03:39 AM    LYMPHOPCT 3 12/21/2024 05:39 AM    MONOPCT 5 12/21/2024 05:39 AM    MYELOPCT 1 12/21/2024 05:39 AM    EOSPCT 2 12/21/2024 05:39 AM    BASOPCT 0 12/21/2024 05:39 AM    MONOSABS 0.51 12/21/2024 05:39 AM    LYMPHSABS 0.25 12/21/2024 05:39 AM    EOSABS 0.17 12/21/2024 05:39 AM    BASOSABS 0.00 12/21/2024 05:39 AM     CMP:    Lab Results   Component Value Date/Time     12/21/2024 05:39 AM    K 3.8 12/21/2024 05:39 AM     12/21/2024 05:39 AM    CO2 20 12/21/2024 05:39 AM    BUN 23 12/21/2024 05:39 AM    CREATININE 1.1 12/21/2024 05:39 AM    LABGLOM 66 12/21/2024 05:39 AM    GLUCOSE 84 12/21/2024 05:39 AM    CALCIUM 9.3 12/21/2024 05:39 AM    BILITOT 29.7 12/21/2024 05:39 AM    ALKPHOS 323 12/21/2024 05:39 AM    AST 67 12/21/2024 05:39 AM    ALT 48 12/21/2024 05:39 AM          Current  Facility-Administered Medications:     hydrOXYzine HCl (ATARAX) tablet 25 mg, 25 mg, Oral, Q6H PRN, Akira Clay MD, 25 mg at 12/21/24 1545    cefTRIAXone (ROCEPHIN) 1,000 mg in sterile water 10 mL IV syringe, 1,000 mg, IntraVENous, Q24H, Silvia Packer MD, 1,000 mg at 12/21/24 1323    enoxaparin (LOVENOX) injection 70 mg, 1 mg/kg, SubCUTAneous, BID, Silvia Packer MD, 70 mg at 12/21/24 0916    dofetilide (TIKOSYN) capsule 125 mcg, 125 mcg, Oral, BID, James Mojica MD, 125 mcg at 12/21/24 1130    sodium chloride flush 0.9 % injection 5-40 mL, 5-40 mL, IntraVENous, 2 times per day, Silvia Packer MD, 10 mL at 12/21/24 0927    sodium chloride flush 0.9 % injection 5-40 mL, 5-40 mL, IntraVENous, PRN, Silvia Packer MD    0.9 % sodium chloride infusion, , IntraVENous, PRN, Silvia Packer MD    potassium chloride (KLOR-CON M) extended release tablet 40 mEq, 40 mEq, Oral, PRN **OR** potassium bicarb-citric acid (EFFER-K) effervescent tablet 40 mEq, 40 mEq, Oral, PRN **OR** potassium chloride 10 mEq/100 mL IVPB (Peripheral Line), 10 mEq, IntraVENous, PRN, Silvia Packer MD    magnesium sulfate 2000 mg in 50 mL IVPB premix, 2,000 mg, IntraVENous, PRN, Silvia Packer MD    ondansetron (ZOFRAN-ODT) disintegrating tablet 4 mg, 4 mg, Oral, Q8H PRN **OR** [DISCONTINUED] ondansetron (ZOFRAN) injection 4 mg, 4 mg, IntraVENous, Q6H PRN, Silvia Packer MD    polyethylene glycol (GLYCOLAX) packet 17 g, 17 g, Oral, Daily PRN, Silvia Packer MD    acetaminophen (TYLENOL) tablet 650 mg, 650 mg, Oral, Q6H PRN **OR** acetaminophen (TYLENOL) suppository 650 mg, 650 mg, Rectal, Q6H PRN, Silvia Packer MD    cholestyramine (QUESTRAN) packet 4 g, 1 packet, Oral, BID, Silvia Packer MD, 4 g at 12/21/24 0919    metoprolol tartrate (LOPRESSOR) tablet 100 mg, 100 mg, Oral, BID, Silvia Packer MD, 100 mg at 12/20/24 2142    morphine (PF) injection 2 mg, 2 mg, IntraVENous, Q4H PRN, Silvia Packer MD    pantoprazole  CT scan and multiple sub-6 mm solid appearing nodules within the right lower lobe. MRI shows large infiltrative left hepatic lobe mass and dilated CBD.  Also has hx of Afib and requires anticoagulation.   12/15 CEA 74.4, PSA 0.11, CA 19-9 pending  12/19 s/p ERCP, stone removal, stent placement and biopsy    Plan:  - Lovenox 1 mg/kg twice daily  - Await pathology from ERCP  - Continue to monitor LFT's, continues to trend up. Bili 28.2   - Continue supportive care  - Follow up with Dr. Calderón at [facility] to review bx and formulate treatment plan.       Electronically signed by Isabel Fox MD on 12/21/2024

## 2024-12-21 NOTE — PLAN OF CARE
Problem: Discharge Planning  Goal: Discharge to home or other facility with appropriate resources  12/21/2024 1213 by Jaylyn Roca RN  Outcome: Progressing  Flowsheets (Taken 12/20/2024 2014 by Tyler Worthy RN)  Discharge to home or other facility with appropriate resources: Identify barriers to discharge with patient and caregiver     Problem: Safety - Adult  Goal: Free from fall injury  12/21/2024 1213 by Jaylyn Roca RN  Outcome: Progressing  Flowsheets (Taken 12/21/2024 1213)  Free From Fall Injury:   Instruct family/caregiver on patient safety   Based on caregiver fall risk screen, instruct family/caregiver to ask for assistance with transferring infant if caregiver noted to have fall risk factors     Problem: ABCDS Injury Assessment  Goal: Absence of physical injury  12/21/2024 1213 by Jaylyn Roca RN  Outcome: Progressing  Flowsheets (Taken 12/21/2024 1213)  Absence of Physical Injury: Implement safety measures based on patient assessment     Problem: Nutrition Deficit:  Goal: Optimize nutritional status  12/21/2024 1213 by Jaylyn Roca RN  Outcome: Progressing  Flowsheets (Taken 12/20/2024 1236 by Alka Thomas, RD, LD)  Nutrient intake appropriate for improving, restoring, or maintaining nutritional needs:   Assess nutritional status and recommend course of action   Monitor oral intake, labs, and treatment plans   Order, calculate, and assess calorie counts as needed   Recommend appropriate diets, oral nutritional supplements, and vitamin/mineral supplements     Problem: Skin/Tissue Integrity  Goal: Absence of new skin breakdown  Description: 1.  Monitor for areas of redness and/or skin breakdown  2.  Assess vascular access sites hourly  3.  Every 4-6 hours minimum:  Change oxygen saturation probe site  4.  Every 4-6 hours:  If on nasal continuous positive airway pressure, respiratory therapy assess nares and determine need for appliance change or resting period.  12/21/2024  1213 by Jaylyn Roca, RN  Outcome: Progressing

## 2024-12-22 NOTE — PLAN OF CARE
Problem: Discharge Planning  Goal: Discharge to home or other facility with appropriate resources  Outcome: Progressing  Flowsheets (Taken 12/21/2024 2000 by Alexia Monroy, RN)  Discharge to home or other facility with appropriate resources:   Identify barriers to discharge with patient and caregiver   Arrange for needed discharge resources and transportation as appropriate   Identify discharge learning needs (meds, wound care, etc)   Refer to discharge planning if patient needs post-hospital services based on physician order or complex needs related to functional status, cognitive ability or social support system     Problem: Safety - Adult  Goal: Free from fall injury  Outcome: Progressing  Flowsheets (Taken 12/21/2024 1213)  Free From Fall Injury:   Instruct family/caregiver on patient safety   Based on caregiver fall risk screen, instruct family/caregiver to ask for assistance with transferring infant if caregiver noted to have fall risk factors     Problem: ABCDS Injury Assessment  Goal: Absence of physical injury  Outcome: Progressing  Flowsheets (Taken 12/21/2024 1213)  Absence of Physical Injury: Implement safety measures based on patient assessment     Problem: Nutrition Deficit:  Goal: Optimize nutritional status  Outcome: Progressing  Flowsheets (Taken 12/20/2024 1236 by Alka Thomas, RD, LD)  Nutrient intake appropriate for improving, restoring, or maintaining nutritional needs:   Assess nutritional status and recommend course of action   Monitor oral intake, labs, and treatment plans   Order, calculate, and assess calorie counts as needed   Recommend appropriate diets, oral nutritional supplements, and vitamin/mineral supplements     Problem: Skin/Tissue Integrity  Goal: Absence of new skin breakdown  Description: 1.  Monitor for areas of redness and/or skin breakdown  2.  Assess vascular access sites hourly  3.  Every 4-6 hours minimum:  Change oxygen saturation probe site  4.  Every 4-6 hours:

## 2024-12-22 NOTE — PLAN OF CARE
Problem: Discharge Planning  Goal: Discharge to home or other facility with appropriate resources  12/22/2024 0052 by Alexia Monroy RN  Outcome: Progressing  Flowsheets (Taken 12/21/2024 2000)  Discharge to home or other facility with appropriate resources:   Identify barriers to discharge with patient and caregiver   Arrange for needed discharge resources and transportation as appropriate   Identify discharge learning needs (meds, wound care, etc)   Refer to discharge planning if patient needs post-hospital services based on physician order or complex needs related to functional status, cognitive ability or social support system  12/21/2024 1213 by Jaylyn Roca RN  Outcome: Progressing  Flowsheets (Taken 12/20/2024 2014 by Tyler Worthy, RN)  Discharge to home or other facility with appropriate resources: Identify barriers to discharge with patient and caregiver     Problem: Safety - Adult  Goal: Free from fall injury  12/22/2024 0052 by Alexia Monroy RN  Outcome: Progressing  12/21/2024 1213 by Jaylyn Roca RN  Outcome: Progressing  Flowsheets (Taken 12/21/2024 1213)  Free From Fall Injury:   Instruct family/caregiver on patient safety   Based on caregiver fall risk screen, instruct family/caregiver to ask for assistance with transferring infant if caregiver noted to have fall risk factors     Problem: ABCDS Injury Assessment  Goal: Absence of physical injury  12/22/2024 0052 by Alexia Monroy RN  Outcome: Progressing  12/21/2024 1213 by Jaylyn Roca RN  Outcome: Progressing  Flowsheets (Taken 12/21/2024 1213)  Absence of Physical Injury: Implement safety measures based on patient assessment     Problem: Nutrition Deficit:  Goal: Optimize nutritional status  12/22/2024 0052 by Alexia Monroy RN  Outcome: Progressing  12/21/2024 1213 by Jaylyn Roca RN  Outcome: Progressing  Flowsheets (Taken 12/20/2024 1236 by Alka Thomas, PENELOPE, LD)  Nutrient intake appropriate for improving, restoring, or

## 2024-12-22 NOTE — PROGRESS NOTES
Primary Care Physician: Ritesh Lugo MD   Admitting Physician:  Akira Clay MD  Admission date and time: 12/16/2024 11:32 PM    Room:  [address] [place] [address]  Admitting diagnosis: Symptomatic cholelithiasis [K80.20]  Common bile duct (CBD) obstruction [K83.1]    Patient Name: Mark Martínez  MRN: [id number]    Date of Service: 12/21/2024     Subjective:  Mark is a 84 y.o. male who was seen and examined today,12/21/2024, at the bedside. Family present during my examination.  Patient is AO x 3 and is feeling tired today.  Patient is still down due to recent diagnosis of metastatic cancer.  He had ERCP with EUS biopsy yesterday and tolerated the procedure well.  We stopped heparin drip and started on full dose Lovenox today.  Patient refused Mediport and stated that he needs some time to process everything and want to get Mediport after he gets biopsy results.  He is denying any chest pain, shortness of breath but states that he is winded down and is feeling overall weak.  His bilirubin remains elevated, but trending LFTs.  I reviewed the note from consultants including cardiology heme-onc and surgery CAT scan of the abdomen pelvis reveals improvement in the intra hepatic blockage we will continue to monitor labs closely awaiting pathology results for further plans    Review of System:   Constitutional:   Denies fever or chills, weight loss or gain, fatigue or malaise.positive for generalized weakness, fatigue   HEENT:   Denies ear pain, sore throat, sinus or eye problems.  Cardiovascular:   Denies any chest pain, irregular heartbeats, or palpitations.   Respiratory:   Denies shortness of breath, coughing, sputum production, hemoptysis, or wheezing.  Gastrointestinal:   Denies nausea, vomiting, diarrhea, or constipation.   Positive for slight abdominal pain   Genitourinary:    Denies any urgency, frequency, hematuria. Voiding  without difficulty.  Extremities:   Denies lower extremity swelling, edema or cyanosis.    Genitalia/Breast:  Deferred    Medication:  Scheduled Meds:   cefTRIAXone (ROCEPHIN) IV  1,000 mg IntraVENous Q24H    enoxaparin  1 mg/kg SubCUTAneous BID    dofetilide  125 mcg Oral BID    sodium chloride flush  5-40 mL IntraVENous 2 times per day    cholestyramine  1 packet Oral BID    metoprolol tartrate  100 mg Oral BID    pantoprazole (PROTONIX) 40 mg in sodium chloride (PF) 0.9 % 10 mL injection  40 mg IntraVENous Q6H     Continuous Infusions:   sodium chloride         Objective Data:  CBC with Differential:    Lab Results   Component Value Date/Time    WBC 9.7 12/21/2024 05:39 AM    RBC 3.67 12/21/2024 05:39 AM    HGB 10.9 12/21/2024 05:39 AM    HCT 30.4 12/21/2024 05:39 AM     12/21/2024 05:39 AM    MCV 82.8 12/21/2024 05:39 AM    MCH 29.7 12/21/2024 05:39 AM    MCHC 35.9 12/21/2024 05:39 AM    RDW 21.5 12/21/2024 05:39 AM    METASPCT 2 12/18/2024 03:39 AM    LYMPHOPCT 3 12/21/2024 05:39 AM    MONOPCT 5 12/21/2024 05:39 AM    MYELOPCT 1 12/21/2024 05:39 AM    EOSPCT 2 12/21/2024 05:39 AM    BASOPCT 0 12/21/2024 05:39 AM    MONOSABS 0.51 12/21/2024 05:39 AM    LYMPHSABS 0.25 12/21/2024 05:39 AM    EOSABS 0.17 12/21/2024 05:39 AM    BASOSABS 0.00 12/21/2024 05:39 AM     CMP:    Lab Results   Component Value Date/Time     12/21/2024 05:39 AM    K 3.8 12/21/2024 05:39 AM     12/21/2024 05:39 AM    CO2 20 12/21/2024 05:39 AM    BUN 23 12/21/2024 05:39 AM    CREATININE 1.1 12/21/2024 05:39 AM    LABGLOM 66 12/21/2024 05:39 AM    GLUCOSE 84 12/21/2024 05:39 AM    CALCIUM 9.3 12/21/2024 05:39 AM    BILITOT 29.7 12/21/2024 05:39 AM    ALKPHOS 323 12/21/2024 05:39 AM    AST 67 12/21/2024 05:39 AM    ALT 48 12/21/2024 05:39 AM       CT ABDOMEN PELVIS W IV CONTRAST Additional Contrast? None   Final Result   1. Significant improvement in the previously noted intra and extrahepatic   biliary ductal dilatation. There is now pneumobilia. A metallic stent is   noted in the common bile duct extending to  the small bowel.   2. Unchanged 2.4 cm low-density mass in the pancreatic head/body region   compatible with neoplasm.   3. Stable hepatic metastases.   4. Minimal perihepatic and pelvic ascites.   5. 6 mm noncalcified pulmonary nodule in the right lower lobe.   6. Unchanged incidental cholelithiasis.   7. Mild sigmoid diverticulosis.   8. Degenerative changes in the lumbar spine and hips.         FL ERCP BILIARY AND PANCREATIC S&I   Final Result   Fluoroscopic support provided for ERCP.  Please refer to the procedure report   for further details.         CT ABDOMEN PELVIS W IV CONTRAST Additional Contrast? None   Final Result   1. 2.4 cm hypodense mass in the region of the pancreatic neck likely   reflecting primary pancreatic neoplasm.   2. Hepatic metastatic disease.   3. Metastatic peripancreatic and periportal lymph nodes.   4. Abdominal and pelvic ascites.   5. Cholelithiasis.   6. Nonobstructive 9 mm calculus in the right kidney.   7. Sigmoid diverticulosis without diverticulitis.             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

## 2024-12-22 NOTE — PROGRESS NOTES
Cardiology  Progress Note      SUBJECTIVE:  No chest pain. No dyspnea.  Extreme generalized body weakness.  Skin itchiness    Current Inpatient Medications  Current Facility-Administered Medications: docusate (COLACE) 50 MG/5ML liquid 100 mg, 100 mg, Oral, Daily  magnesium citrate solution 296 mL, 296 mL, Oral, Once  hydrOXYzine HCl (ATARAX) tablet 25 mg, 25 mg, Oral, Q6H PRN  cefTRIAXone (ROCEPHIN) 1,000 mg in sterile water 10 mL IV syringe, 1,000 mg, IntraVENous, Q24H  enoxaparin (LOVENOX) injection 70 mg, 1 mg/kg, SubCUTAneous, BID  dofetilide (TIKOSYN) capsule 125 mcg, 125 mcg, Oral, BID  sodium chloride flush 0.9 % injection 5-40 mL, 5-40 mL, IntraVENous, 2 times per day  sodium chloride flush 0.9 % injection 5-40 mL, 5-40 mL, IntraVENous, PRN  0.9 % sodium chloride infusion, , IntraVENous, PRN  potassium chloride (KLOR-CON M) extended release tablet 40 mEq, 40 mEq, Oral, PRN **OR** potassium bicarb-citric acid (EFFER-K) effervescent tablet 40 mEq, 40 mEq, Oral, PRN **OR** potassium chloride 10 mEq/100 mL IVPB (Peripheral Line), 10 mEq, IntraVENous, PRN  magnesium sulfate 2000 mg in 50 mL IVPB premix, 2,000 mg, IntraVENous, PRN  ondansetron (ZOFRAN-ODT) disintegrating tablet 4 mg, 4 mg, Oral, Q8H PRN **OR** [DISCONTINUED] ondansetron (ZOFRAN) injection 4 mg, 4 mg, IntraVENous, Q6H PRN  polyethylene glycol (GLYCOLAX) packet 17 g, 17 g, Oral, Daily PRN  acetaminophen (TYLENOL) tablet 650 mg, 650 mg, Oral, Q6H PRN **OR** acetaminophen (TYLENOL) suppository 650 mg, 650 mg, Rectal, Q6H PRN  cholestyramine (QUESTRAN) packet 4 g, 1 packet, Oral, BID  metoprolol tartrate (LOPRESSOR) tablet 100 mg, 100 mg, Oral, BID  morphine (PF) injection 2 mg, 2 mg, IntraVENous, Q4H PRN  pantoprazole (PROTONIX) 40 mg in sodium chloride (PF) 0.9 % 10 mL injection, 40 mg, IntraVENous, Q6H      Physical  VITALS:  /76   Pulse 85   Temp 97.6 °F (36.4 °C) (Axillary)   Resp 18   Ht 1.803 m (5' 10.98\")   Wt 72.6 kg (160 lb)    for paroxysmal atrial fibrillation.    Dose of Tikosyn was decreased to 125 mcg twice a day in view of mild QTC prolongation around 511 ms especially patient lost a lot of weight lately.    Blood pressure was on the low side yesterday.  Metoprolol was held yesterday    Recommend decreasing dose of metoprolol to 25 mg twice a day    His prognosis overall is poor in view of all above.    Case was discussed with family at bedside on admission    Spiritual team is on the case              Electronically signed by James Mojica MD on 12/22/2024 at 8:58 AM

## 2024-12-22 NOTE — PROGRESS NOTES
Primary Care Physician: Ritesh Lugo MD   Admitting Physician:  Akira Clay MD  Admission date and time: 12/16/2024 11:32 PM    Room:  [address] [place] [address]  Admitting diagnosis: Symptomatic cholelithiasis [K80.20]  Common bile duct (CBD) obstruction [K83.1]    Patient Name: Mrak Martínez  MRN: [id number]    Date of Service: 12/22/2024     Subjective:  Mark is a 84 y.o. male who was seen and examined today,12/22/2024, at the bedside. Family present during my examination.  Patient is AO x 3 and is feeling tired today.  Patient is still down due to recent diagnosis of metastatic cancer.  He had ERCP with EUS biopsy yesterday and tolerated the procedure well.  We stopped heparin drip and started on full dose Lovenox today.  Patient refused Mediport and stated that he needs some time to process everything and want to get Mediport after he gets biopsy results.  He is denying any chest pain, shortness of breath but states that he is winded down and is feeling overall weak.  His bilirubin remains elevated, but trending LFTs.  I reviewed the note from consultants including cardiology heme-onc and surgery CAT scan of the abdomen pelvis reveals improvement in the intra hepatic blockage we will continue to monitor labs closely awaiting pathology results for further plans    Review of System:   Constitutional:   Denies fever or chills, weight loss or gain, fatigue or malaise.positive for generalized weakness, fatigue   HEENT:   Denies ear pain, sore throat, sinus or eye problems.  Cardiovascular:   Denies any chest pain, irregular heartbeats, or palpitations.   Respiratory:   Denies shortness of breath, coughing, sputum production, hemoptysis, or wheezing.  Gastrointestinal:   Denies nausea, vomiting, diarrhea, or constipation.   Positive for slight abdominal pain   Genitourinary:    Denies any urgency, frequency, hematuria. Voiding  without difficulty.  Extremities:   Denies lower extremity swelling, edema or cyanosis.  common bile duct extending to the small bowel.   2. Unchanged 2.4 cm low-density mass in the pancreatic head/body region   compatible with neoplasm.   3. Stable hepatic metastases.   4. Minimal perihepatic and pelvic ascites.   5. 6 mm noncalcified pulmonary nodule in the right lower lobe.   6. Unchanged incidental cholelithiasis.   7. Mild sigmoid diverticulosis.   8. Degenerative changes in the lumbar spine and hips.         FL ERCP BILIARY AND PANCREATIC S&I   Final Result   Fluoroscopic support provided for ERCP.  Please refer to the procedure report   for further details.         CT ABDOMEN PELVIS W IV CONTRAST Additional Contrast? None   Final Result   1. 2.4 cm hypodense mass in the region of the pancreatic neck likely   reflecting primary pancreatic neoplasm.   2. Hepatic metastatic disease.   3. Metastatic peripancreatic and periportal lymph nodes.   4. Abdominal and pelvic ascites.   5. Cholelithiasis.   6. Nonobstructive 9 mm calculus in the right kidney.   7. Sigmoid diverticulosis without diverticulitis.             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  Cholelithiasis  Dark stools/anemia  Urinary tract infection  Elevated INR  History of A fib  History of colon cancer s/p sigmoidectomy/high anterior resection 11/10/20 ([facility]), pathology consistent with invasive moderately differentiated colonic adenocarcinoma with no positive lymph nodes  History of prostate cancer treated with radiation and surgery  History of basal cell carcinoma of scalp s/p excision  History of CAD s/p CABG x6, pacemaker placement (2021)     Plan:   Painless jaundice  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

## 2024-12-22 NOTE — PROGRESS NOTES
General Surgery Progress Note  [facility]    Patient's Name/Date of Birth: Mark Martínez / 1940    Date: December 22, 2024     Surgeon: MD Guille    Chief Complaint: Obstructive jaundice    Patient Active Problem List   Diagnosis    Carpal tunnel syndrome of right wrist    Symptomatic cholelithiasis    Common bile duct (CBD) obstruction    Moderate protein-calorie malnutrition (HCC)    Metastatic cancer to liver (HCC)    Obstructive jaundice due to malignant neoplasm (HCC)       Subjective: No new complaints.  Denies any abdominal pain.  Patient and daughter are starting to grasp the degree of the extent of his neoplasm    Objective:  /76   Pulse 85   Temp 97.6 °F (36.4 °C) (Axillary)   Resp 18   Ht 1.803 m (5' 10.98\")   Wt 72.6 kg (160 lb)   SpO2 100%   BMI 22.33 kg/m²   Labs:  Recent Labs     12/20/24  0452 12/20/24  1129 12/21/24  0003 12/21/24  0539 12/22/24  0642   WBC 10.7  --   --  9.7 9.8   HGB 11.2*   < > 10.9* 10.9* 10.2*   HCT 32.0*   < > 30.8* 30.4* 28.8*    < > = values in this interval not displayed.     Lab Results   Component Value Date    CREATININE 1.1 12/21/2024    BUN 23 12/21/2024     12/21/2024    K 3.8 12/21/2024     (H) 12/21/2024    CO2 20 (L) 12/21/2024     No results for input(s): \"LIPASE\", \"AMYLASE\" in the last 72 hours.  CBC with Differential:    Lab Results   Component Value Date/Time    WBC 9.8 12/22/2024 06:42 AM    RBC 3.42 12/22/2024 06:42 AM    HGB 10.2 12/22/2024 06:42 AM    HCT 28.8 12/22/2024 06:42 AM     12/22/2024 06:42 AM    MCV 84.2 12/22/2024 06:42 AM    MCH 29.8 12/22/2024 06:42 AM    MCHC 35.4 12/22/2024 06:42 AM    RDW 21.6 12/22/2024 06:42 AM    METASPCT 2 12/18/2024 03:39 AM    LYMPHOPCT 7 12/22/2024 06:42 AM    MONOPCT 6 12/22/2024 06:42 AM    MYELOPCT 1 12/21/2024 05:39 AM    EOSPCT 4 12/22/2024 06:42 AM    BASOPCT 0 12/22/2024 06:42 AM    MONOSABS 0.60 12/22/2024 06:42 AM    LYMPHSABS 0.68 12/22/2024 06:42 AM

## 2024-12-23 NOTE — PROGRESS NOTES
Hepatobiliary and Pancreatic Surgery Progress Note    CC: Obstructive jaundice due to malignant neoplasm    Subjective: Patient states that he is still very itchy.  He is tolerating a diet.  His CEA is 74, CA 19-9 is 202.  He does complain of a lack of appetite and lack of energy.    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  -The right side of his liver is very well-drained however he has extensive tumor burden in the middle segment 4B and A, segments 2 and 3 (more than what was reported on the CT scan) as more than half of his liver has tumor.  The tumors are causing disconnected ducts in the left lobe of the liver and middle segments however there is no evidence of ductal dilation in those segments.  The right side is completely drained.  -Will add Benadryl for itching  -Awaiting liver biopsy results  -Bilirubin slightly down trended today  -Okay for patient to be discharged with a follow-up to Dr. Chacko, the patient is well-drained at this point and due to his tumor burden in the left side of his liver [facility]  -Appreciate oncology recommendations  - I discussed the above with the patient today.  - Unfortunately there isn't much for us to offer surgically, its whether or not once the biopsy's come back if he can tolerate any chemotherapy without putting him into liver failure, but I will defer this to  Medical Oncology    Thank you for the consultation and allowing me to take part in Mr. Martínez' care.      Negro Andrea MD 12/23/2024 7:47 AM

## 2024-12-23 NOTE — PROGRESS NOTES
Cardiology  Progress Note      SUBJECTIVE: Patient looked sad and somewhat depressed this morning.  He thinks he is deteriorating clinically    Current Inpatient Medications  Current Facility-Administered Medications: diphenhydrAMINE (BENADRYL) tablet 25 mg, 25 mg, Oral, Q6H PRN  docusate (COLACE) 50 MG/5ML liquid 100 mg, 100 mg, Oral, Daily  metoprolol tartrate (LOPRESSOR) tablet 25 mg, 25 mg, Oral, BID  hydrOXYzine HCl (ATARAX) tablet 25 mg, 25 mg, Oral, Q6H PRN  cefTRIAXone (ROCEPHIN) 1,000 mg in sterile water 10 mL IV syringe, 1,000 mg, IntraVENous, Q24H  enoxaparin (LOVENOX) injection 70 mg, 1 mg/kg, SubCUTAneous, BID  dofetilide (TIKOSYN) capsule 125 mcg, 125 mcg, Oral, BID  sodium chloride flush 0.9 % injection 5-40 mL, 5-40 mL, IntraVENous, 2 times per day  sodium chloride flush 0.9 % injection 5-40 mL, 5-40 mL, IntraVENous, PRN  0.9 % sodium chloride infusion, , IntraVENous, PRN  potassium chloride (KLOR-CON M) extended release tablet 40 mEq, 40 mEq, Oral, PRN **OR** potassium bicarb-citric acid (EFFER-K) effervescent tablet 40 mEq, 40 mEq, Oral, PRN **OR** potassium chloride 10 mEq/100 mL IVPB (Peripheral Line), 10 mEq, IntraVENous, PRN  magnesium sulfate 2000 mg in 50 mL IVPB premix, 2,000 mg, IntraVENous, PRN  ondansetron (ZOFRAN-ODT) disintegrating tablet 4 mg, 4 mg, Oral, Q8H PRN **OR** [DISCONTINUED] ondansetron (ZOFRAN) injection 4 mg, 4 mg, IntraVENous, Q6H PRN  polyethylene glycol (GLYCOLAX) packet 17 g, 17 g, Oral, Daily PRN  acetaminophen (TYLENOL) tablet 650 mg, 650 mg, Oral, Q6H PRN **OR** acetaminophen (TYLENOL) suppository 650 mg, 650 mg, Rectal, Q6H PRN  morphine (PF) injection 2 mg, 2 mg, IntraVENous, Q4H PRN  pantoprazole (PROTONIX) 40 mg in sodium chloride (PF) 0.9 % 10 mL injection, 40 mg, IntraVENous, Q6H      Physical  VITALS:  BP (!) 85/60   Pulse 86   Temp 97.7 °F (36.5 °C) (Axillary)   Resp 19   Ht 1.803 m (5' 10.98\")   Wt 72.6 kg (160 lb)   SpO2 100%   BMI 22.33 kg/m²

## 2024-12-23 NOTE — PROGRESS NOTES
Subjective:    The patient is awake and alert, family present at the bedside. He feels horrible, states he has had no improvement at all. Appetite is poor. Denies n/v.     Objective:    BP (!) 93/56   Pulse 67   Temp 98.1 °F (36.7 °C) (Axillary)   Resp 18   Ht 1.803 m (5' 10.98\")   Wt 72.6 kg (160 lb)   SpO2 97%   BMI 22.33 kg/m²     General: Alert, oriented, no acute acute distress. Appears critically ill  HEENT: No thrush or mucositis, EOMI, PERRLA  Heart:  RRR, no murmurs, gallops, or rubs.  Lungs:  Diminished in bases, no wheeze, rales or rhonchi  Abd: BS present, nontender, nondistended, no masses  Extrem:  No clubbing, cyanosis, or edema  Lymphatics: No palpable adenopathy in cervical and supraclavicular regions  Skin: Intact, no petechia or purpura. Jaundice    CBC with Differential:    Lab Results   Component Value Date/Time    WBC 9.8 12/22/2024 06:42 AM    RBC 3.42 12/22/2024 06:42 AM    HGB 10.2 12/22/2024 06:42 AM    HCT 28.8 12/22/2024 06:42 AM     12/22/2024 06:42 AM    MCV 84.2 12/22/2024 06:42 AM    MCH 29.8 12/22/2024 06:42 AM    MCHC 35.4 12/22/2024 06:42 AM    RDW 21.6 12/22/2024 06:42 AM    METASPCT 2 12/18/2024 03:39 AM    LYMPHOPCT 7 12/22/2024 06:42 AM    MONOPCT 6 12/22/2024 06:42 AM    MYELOPCT 1 12/21/2024 05:39 AM    EOSPCT 4 12/22/2024 06:42 AM    BASOPCT 0 12/22/2024 06:42 AM    MONOSABS 0.60 12/22/2024 06:42 AM    LYMPHSABS 0.68 12/22/2024 06:42 AM    EOSABS 0.43 12/22/2024 06:42 AM    BASOSABS 0.00 12/22/2024 06:42 AM     CMP:    Lab Results   Component Value Date/Time     12/22/2024 06:42 AM    K 3.7 12/22/2024 06:42 AM     12/22/2024 06:42 AM    CO2 20 12/22/2024 06:42 AM    BUN 26 12/22/2024 06:42 AM    CREATININE 1.3 12/22/2024 06:42 AM    LABGLOM 53 12/22/2024 06:42 AM    GLUCOSE 75 12/22/2024 06:42 AM    CALCIUM 9.3 12/22/2024 06:42 AM    BILITOT 29.1 12/22/2024 06:42 AM    ALKPHOS 314 12/22/2024 06:42 AM    AST 61 12/22/2024 06:42 AM    ALT 46  12/22/2024 06:42 AM              Current Facility-Administered Medications:     docusate (COLACE) 50 MG/5ML liquid 100 mg, 100 mg, Oral, Daily, Hamlet Han MD, 100 mg at 12/22/24 1244    metoprolol tartrate (LOPRESSOR) tablet 25 mg, 25 mg, Oral, BID, James Mojica MD, 25 mg at 12/22/24 2051    hydrOXYzine HCl (ATARAX) tablet 25 mg, 25 mg, Oral, Q6H PRN, Akira Clay MD, 25 mg at 12/22/24 2119    cefTRIAXone (ROCEPHIN) 1,000 mg in sterile water 10 mL IV syringe, 1,000 mg, IntraVENous, Q24H, Silvia Packer MD, 1,000 mg at 12/22/24 1244    enoxaparin (LOVENOX) injection 70 mg, 1 mg/kg, SubCUTAneous, BID, Silvia Packer MD, 70 mg at 12/22/24 2050    dofetilide (TIKOSYN) capsule 125 mcg, 125 mcg, Oral, BID, James Mojica MD, 125 mcg at 12/22/24 2051    sodium chloride flush 0.9 % injection 5-40 mL, 5-40 mL, IntraVENous, 2 times per day, Silvia Packer MD, 10 mL at 12/22/24 2115    sodium chloride flush 0.9 % injection 5-40 mL, 5-40 mL, IntraVENous, PRN, Silvia Packer MD    0.9 % sodium chloride infusion, , IntraVENous, PRN, Silvia Packer MD    potassium chloride (KLOR-CON M) extended release tablet 40 mEq, 40 mEq, Oral, PRN **OR** potassium bicarb-citric acid (EFFER-K) effervescent tablet 40 mEq, 40 mEq, Oral, PRN **OR** potassium chloride 10 mEq/100 mL IVPB (Peripheral Line), 10 mEq, IntraVENous, PRN, Silvia Packer MD    magnesium sulfate 2000 mg in 50 mL IVPB premix, 2,000 mg, IntraVENous, PRN, Silvia Packer MD    ondansetron (ZOFRAN-ODT) disintegrating tablet 4 mg, 4 mg, Oral, Q8H PRN **OR** [DISCONTINUED] ondansetron (ZOFRAN) injection 4 mg, 4 mg, IntraVENous, Q6H PRN, Silvia Packer MD    polyethylene glycol (GLYCOLAX) packet 17 g, 17 g, Oral, Daily PRN, Silvia Packer MD    acetaminophen (TYLENOL) tablet 650 mg, 650 mg, Oral, Q6H PRN **OR** acetaminophen (TYLENOL) suppository 650 mg, 650 mg, Rectal, Q6H PRN, Silvia Packer MD    cholestyramine (QUESTRAN) packet 4 g, 1 packet,

## 2024-12-23 NOTE — PLAN OF CARE
Problem: Discharge Planning  Goal: Discharge to home or other facility with appropriate resources  12/23/2024 1020 by Naomy Hedrick RN  Outcome: Progressing     Problem: Safety - Adult  Goal: Free from fall injury  12/23/2024 1020 by Naomy Hedrick RN  Outcome: Progressing     Problem: ABCDS Injury Assessment  Goal: Absence of physical injury  12/23/2024 1020 by Naomy Hedrick RN  Outcome: Progressing     Problem: Nutrition Deficit:  Goal: Optimize nutritional status  12/23/2024 1020 by Naomy Hedrick RN  Outcome: Progressing     Problem: Skin/Tissue Integrity  Goal: Absence of new skin breakdown  Description: 1.  Monitor for areas of redness and/or skin breakdown  2.  Assess vascular access sites hourly  3.  Every 4-6 hours minimum:  Change oxygen saturation probe site  4.  Every 4-6 hours:  If on nasal continuous positive airway pressure, respiratory therapy assess nares and determine need for appliance change or resting period.  12/23/2024 1020 by Naomy Hedrick RN  Outcome: Progressing

## 2024-12-23 NOTE — PROGRESS NOTES
General Surgery Progress Note  [facility]    Patient's Name/Date of Birth: Mark Martínez / 1940    Date: December 23, 2024     Surgeon: MD Guille    Chief Complaint: Obstructive jaundice    Patient Active Problem List   Diagnosis    Carpal tunnel syndrome of right wrist    Symptomatic cholelithiasis    Common bile duct (CBD) obstruction    Moderate protein-calorie malnutrition (HCC)    Metastatic cancer to liver (HCC)    Obstructive jaundice due to malignant neoplasm (HCC)       Subjective: No new complaints.  Discussed bilirubin 28 today from 29 yesterday.  Still awaiting final pathology    Objective:  BP (!) 93/56   Pulse 67   Temp 98.1 °F (36.7 °C) (Axillary)   Resp 18   Ht 1.803 m (5' 10.98\")   Wt 72.6 kg (160 lb)   SpO2 97%   BMI 22.33 kg/m²   Labs:  Recent Labs     12/21/24  0539 12/22/24  0642 12/23/24  0626   WBC 9.7 9.8 8.7   HGB 10.9* 10.2* 9.1*   HCT 30.4* 28.8* 25.8*     Lab Results   Component Value Date    CREATININE 1.6 (H) 12/23/2024    BUN 43 (H) 12/23/2024     12/23/2024    K 3.7 12/23/2024     12/23/2024    CO2 21 (L) 12/23/2024     No results for input(s): \"LIPASE\", \"AMYLASE\" in the last 72 hours.  CBC with Differential:    Lab Results   Component Value Date/Time    WBC 8.7 12/23/2024 06:26 AM    RBC 3.02 12/23/2024 06:26 AM    HGB 9.1 12/23/2024 06:26 AM    HCT 25.8 12/23/2024 06:26 AM     12/23/2024 06:26 AM    MCV 85.4 12/23/2024 06:26 AM    MCH 30.1 12/23/2024 06:26 AM    MCHC 35.3 12/23/2024 06:26 AM    RDW 21.4 12/23/2024 06:26 AM    METASPCT 1 12/23/2024 06:26 AM    LYMPHOPCT 6 12/23/2024 06:26 AM    MONOPCT 10 12/23/2024 06:26 AM    MYELOPCT 1 12/21/2024 05:39 AM    EOSPCT 2 12/23/2024 06:26 AM    BASOPCT 1 12/23/2024 06:26 AM    MONOSABS 0.83 12/23/2024 06:26 AM    LYMPHSABS 0.53 12/23/2024 06:26 AM    EOSABS 0.15 12/23/2024 06:26 AM    BASOSABS 0.08 12/23/2024 06:26 AM     CMP:    Lab Results   Component Value Date/Time     12/23/2024  06:26 AM    K 3.7 12/23/2024 06:26 AM     12/23/2024 06:26 AM    CO2 21 12/23/2024 06:26 AM    BUN 43 12/23/2024 06:26 AM    CREATININE 1.6 12/23/2024 06:26 AM    LABGLOM 44 12/23/2024 06:26 AM    GLUCOSE 105 12/23/2024 06:26 AM    CALCIUM 9.3 12/23/2024 06:26 AM    BILITOT 28.0 12/23/2024 06:26 AM    ALKPHOS 299 12/23/2024 06:26 AM    AST 63 12/23/2024 06:26 AM    ALT 46 12/23/2024 06:26 AM       General appearance:  NAD  Head: NCAT, PERRLA, EOMI, scleral icterus  Neck: supple, no masses  Lungs: Nonlabored, equal chest rise bilateral  Heart: Reg rate  Abdomen: soft, nondistended, nontender  Skin; no lesions, jaundice  Gu: no cva tenderness  Extremities: extremities normal, atraumatic, no cyanosis or edema      Assessment/Plan:  Mark Martínez is a 84 y.o. 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 Chacko if any further treatment is desired  Follow-up with me as needed    Hamlet Han MD

## 2024-12-23 NOTE — PROGRESS NOTES
Physical Therapy  Physical Therapy    Room #:   0623/0623-02    Date: 2024       Patient Name: Mrak Martínez  : 1940      MRN: [id number]     Patient unavailable for physical therapy treatment due to  patient not feeling good and tired . Will attempt PT treatment tomorrow. Thank you.      Paul Martinez  [facility]  LIC # [id number]

## 2024-12-23 NOTE — CONSULTS
Palliative Medicine  Progress Note    Palliative referral placed for [facility].  LSW coordinating.  Will remove from [facility] Palliative census.    Keaton Jones APRN-CNP  Palliative Medicine    Note: This report was completed using computerPinchPoint voiced recognition software.  Every effort has been made to ensure accuracy; however, inadvertent computerized transcription errors may be present.

## 2024-12-23 NOTE — PROGRESS NOTES
Primary Care Physician: Ritesh Lugo MD   Admitting Physician:  Akira Clay MD  Admission date and time: 12/16/2024 11:32 PM    Room:  [address] [place] [address]  Admitting diagnosis: Symptomatic cholelithiasis [K80.20]  Common bile duct (CBD) obstruction [K83.1]    Patient Name: Mark Martínez  MRN: [id number]    Date of Service: 12/23/2024     Subjective:  Mark is a 84 y.o. male who was seen and examined today,12/23/2024, at the bedside.  Patient's ex-wife present during my examination.  Patient is getting tired and is not feeling very good today.  He denied physical therapy today.  His appetite is poor but he is trying to eat some and trying to drink fluids.  His bilirubin is 28 today slightly better than yesterday.  Heme-onc recommended palliative care and hospice.  Will consult [facility] for supportive care.  Follow-up with Dr. Calderón at the [facility].  Patient currently is not a candidate for chemotherapy because of elevated liver function tests.  The right side of the liver is very well-trained however he has extensive tumor burden.  As per general surgery nothing else could be done.  Biopsy result has not been back yet.  Blood pressure was low today.  Metoprolol was held.  Creatinine levels elevated to 1.6.  Patient was given 500 mL of fluid bolus.  Patient is encouraged to drink fluids.  Patient stated that he is not ready for discharge today as he has no bed at home.  He is recommending to get discharged tomorrow.      Review of System:   Constitutional:   Denies fever or chills, weight loss or gain, fatigue or malaise.positive for generalized weakness, fatigue   HEENT:   Denies ear pain, sore throat, sinus or eye problems. +Yellow sclera  Cardiovascular:   Denies any chest pain, irregular heartbeats, or palpitations.   Respiratory:   Denies shortness of breath, coughing, sputum production, hemoptysis, or wheezing.  Gastrointestinal:   Denies nausea, vomiting, diarrhea, or constipation.   Genitourinary:  small bowel.   2. Unchanged 2.4 cm low-density mass in the pancreatic head/body region   compatible with neoplasm.   3. Stable hepatic metastases.   4. Minimal perihepatic and pelvic ascites.   5. 6 mm noncalcified pulmonary nodule in the right lower lobe.   6. Unchanged incidental cholelithiasis.   7. Mild sigmoid diverticulosis.   8. Degenerative changes in the lumbar spine and hips.         FL ERCP BILIARY AND PANCREATIC S&I   Final Result   Fluoroscopic support provided for ERCP.  Please refer to the procedure report   for further details.         CT ABDOMEN PELVIS W IV CONTRAST Additional Contrast? None   Final Result   1. 2.4 cm hypodense mass in the region of the pancreatic neck likely   reflecting primary pancreatic neoplasm.   2. Hepatic metastatic disease.   3. Metastatic peripancreatic and periportal lymph nodes.   4. Abdominal and pelvic ascites.   5. Cholelithiasis.   6. Nonobstructive 9 mm calculus in the right kidney.   7. Sigmoid diverticulosis without diverticulitis.             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  Cholelithiasis  Dark stools/anemia  Urinary tract infection  Acute kidney injury  Hypertension  Elevated INR  History of A fib  History of colon cancer s/p sigmoidectomy/high anterior resection 11/10/20 ([facility]), pathology consistent with invasive moderately differentiated colonic adenocarcinoma with no positive lymph nodes  History of prostate cancer treated with radiation and surgery  History of basal cell carcinoma of scalp s/p excision  History of CAD s/p CABG x6, pacemaker placement (2021)     Plan:   Painless jaundice  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  Patient wants to get discharged tomorrow.    Continue current therapy.  See orders for further plan of care.      More than 50% of my time was spent at the bedside counseling/coordinating care with the patient and/or family with face to face contact.  This time was spent reviewing notes and laboratory data as well as instructing and counseling the patient. Time I spent with the family or surrogate(s) is included only if the patient was incapable of providing the necessary information or participating in medical decisions. I also discussed the differential diagnosis and all of the proposed management plans with the patient and individuals accompanying the patient. I am readily available for any further decision-making and intervention.       Electronically signed by Silvia Packer MD on 12/23/2024 at 2:41 PM

## 2024-12-23 NOTE — CARE COORDINATION
Order noted for \"[facility]\".  Chart notes reviewed.  SW spoke with patient and his sister, and Ex wife Jan who is at bedside.  Reviewed with him orders for Hospice, he was adament he spoke with oncology and Dr Packer and states they did talk about this but \"he just isn't ready for that talk yet, doesn't want to see anyone\".  Offered HHC, and he declined, states he doesn't want this right now when he is ready he will do it.  He states he has everything he needs at home, a WW, cane, etc.  He just wants to go home and \"be left alone\".  States to his Ex wife \"I will just board up my house so no one can come\".  RN requested SW call daughter, Toyin who has been cleaning up house and getting it ready for him to come home to.  She is very upset.  States she doesn't want him to come home without services in place and if he doesn't agree then she wants him in a nursing home.  Explained, he is refusing to work with therapy here, we can't make him go to a nursing home but also can't make him accept service at home.  She is going to arrange a family meeting with her sister and patients brother.  She would like [facility] to call her and set up a time to meet with her and patient tomorrow to go over services.  She also wants a Hospital bed set up at home, explained if he elects to go home with hospice they can get a bed but if not, will need A LOT of documentation regarding medical necessity from Dr Packer and DME orders and he may not qualify for a hospital bed through insurance.  She again is very upset.  Much support offered and explained will continue to work on appropriate DC plan.  Referral to Elida at [facility] and requested she arrange meeting tomorrow.

## 2024-12-23 NOTE — PLAN OF CARE
Problem: Discharge Planning  Goal: Discharge to home or other facility with appropriate resources  12/22/2024 2339 by Alexia Monroy RN  Outcome: Progressing  Flowsheets (Taken 12/22/2024 2000)  Discharge to home or other facility with appropriate resources:   Identify barriers to discharge with patient and caregiver   Arrange for needed discharge resources and transportation as appropriate   Identify discharge learning needs (meds, wound care, etc)   Refer to discharge planning if patient needs post-hospital services based on physician order or complex needs related to functional status, cognitive ability or social support system  12/22/2024 1559 by Jaylyn Roca, RN  Outcome: Progressing  Flowsheets (Taken 12/21/2024 2000 by Alexia Monroy, RN)  Discharge to home or other facility with appropriate resources:   Identify barriers to discharge with patient and caregiver   Arrange for needed discharge resources and transportation as appropriate   Identify discharge learning needs (meds, wound care, etc)   Refer to discharge planning if patient needs post-hospital services based on physician order or complex needs related to functional status, cognitive ability or social support system     Problem: Safety - Adult  Goal: Free from fall injury  12/22/2024 2339 by Alexia Monroy RN  Outcome: Progressing  12/22/2024 1559 by Jaylyn Roca, RN  Outcome: Progressing  Flowsheets (Taken 12/21/2024 1213)  Free From Fall Injury:   Instruct family/caregiver on patient safety   Based on caregiver fall risk screen, instruct family/caregiver to ask for assistance with transferring infant if caregiver noted to have fall risk factors     Problem: ABCDS Injury Assessment  Goal: Absence of physical injury  12/22/2024 2339 by Alexia Monroy RN  Outcome: Progressing  12/22/2024 1559 by Jaylyn Roca, RN  Outcome: Progressing  Flowsheets (Taken 12/21/2024 1213)  Absence of Physical Injury: Implement safety measures based on patient
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
## 2024-12-24 PROBLEM — I95.9 HYPOTENSION: Status: ACTIVE | Noted: 2024-01-01

## 2024-12-24 NOTE — PROGRESS NOTES
Spiritual Health History and Assessment/Progress Note  DENISSE Choudhary    (P) Grief, Loss, and Adjustments, Crisis,  ,  ,      Name: Mark Martínez MRN: [id number]    Age: 84 y.o.     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)     Date: 12/24/2024                           Spiritual Assessment began in [facility] IMCU        Referral/Consult From: (P) Nurse   Encounter Overview/Reason: (P) Grief, Loss, and Adjustments, Crisis  Service Provided For: (P) Patient and family together    Angelina, Belief, Meaning:   Patient unable to assess at this time  Family/Friends are connected with a angelina tradition or spiritual practice and have beliefs or practices that help with coping during difficult times      Importance and Influence:  Patient unable to assess at this time  Family/Friends have spiritual/personal beliefs that influence decisions regarding the patient's health    Community:  Patient is connected with a spiritual community  Family/Friends are connected with a spiritual community:    Assessment and Plan of Care:     Patient Interventions include: Facilitated expression of thoughts and feelings  Family/Friends Interventions include: Facilitated expression of thoughts and feelings, Affirmed coping skills/support systems, and Facilitated life review and/or legacy    Patient Plan of Care: No spiritual needs identified for follow-up  Family/Friends Plan of Care: No spiritual needs identified for follow-up    Electronically signed by Chaplain Josh on 12/24/2024 at 6:32 PM

## 2024-12-24 NOTE — PROGRESS NOTES
RRT called. Pt was found on the floor lying on his back. Pt stated he was trying to get his urinal and  lost his balance. Within 5 minutes prior to the event this RN was in the room attending to the patient and his roommate.  The patient was sitting at the side of the bed and began to eat his AM meal.  Pt was A&O x4 at the time.  This RN helped the pt set up his tray. The patient was asked if he needed anything before the RN was to return with morning meds.  Prior to leaving the room this RN told the patient she would be returning shortly if he needed anything and reminded the patient to use his call light.  See code documentation for RRT and any new orders.

## 2024-12-24 NOTE — PROGRESS NOTES
Cardiology  Progress Note      SUBJECTIVE:  Patient was sleeping flat in bed when I came to see him this morning.  He looked very weak but no acute distress.    Current Inpatient Medications  Current Facility-Administered Medications: lactated ringers infusion, , IntraVENous, Continuous  sodium bicarbonate tablet 650 mg, 650 mg, Oral, BID  diphenhydrAMINE (BENADRYL) tablet 25 mg, 25 mg, Oral, Q6H PRN  pantoprazole (PROTONIX) 40 mg in sodium chloride (PF) 0.9 % 10 mL injection, 40 mg, IntraVENous, Daily  docusate (COLACE) 50 MG/5ML liquid 100 mg, 100 mg, Oral, Daily  hydrOXYzine HCl (ATARAX) tablet 25 mg, 25 mg, Oral, Q6H PRN  cefTRIAXone (ROCEPHIN) 1,000 mg in sterile water 10 mL IV syringe, 1,000 mg, IntraVENous, Q24H  enoxaparin (LOVENOX) injection 70 mg, 1 mg/kg, SubCUTAneous, BID  sodium chloride flush 0.9 % injection 5-40 mL, 5-40 mL, IntraVENous, 2 times per day  sodium chloride flush 0.9 % injection 5-40 mL, 5-40 mL, IntraVENous, PRN  0.9 % sodium chloride infusion, , IntraVENous, PRN  potassium chloride (KLOR-CON M) extended release tablet 40 mEq, 40 mEq, Oral, PRN **OR** potassium bicarb-citric acid (EFFER-K) effervescent tablet 40 mEq, 40 mEq, Oral, PRN **OR** potassium chloride 10 mEq/100 mL IVPB (Peripheral Line), 10 mEq, IntraVENous, PRN  magnesium sulfate 2000 mg in 50 mL IVPB premix, 2,000 mg, IntraVENous, PRN  ondansetron (ZOFRAN-ODT) disintegrating tablet 4 mg, 4 mg, Oral, Q8H PRN **OR** [DISCONTINUED] ondansetron (ZOFRAN) injection 4 mg, 4 mg, IntraVENous, Q6H PRN  polyethylene glycol (GLYCOLAX) packet 17 g, 17 g, Oral, Daily PRN  acetaminophen (TYLENOL) tablet 650 mg, 650 mg, Oral, Q6H PRN **OR** acetaminophen (TYLENOL) suppository 650 mg, 650 mg, Rectal, Q6H PRN  morphine (PF) injection 2 mg, 2 mg, IntraVENous, Q4H PRN      Physical  VITALS:  BP (!) 85/64   Pulse 72   Temp 97.1 °F (36.2 °C)   Resp 18   Ht 1.803 m (5' 10.98\")   Wt 72.6 kg (160 lb)   SpO2 100%   BMI 22.33 kg/m²   CURRENT  patient lost a lot of weight lately.    Blood pressure was on the low side yesterday.  Metoprolol was stopped.    Creatinine is up to 1.6 .  It was 1.1 on admission    With the worsening renal function I recommend stopping Tikosyn as well    His prognosis overall is poor in view of all above.    Case was discussed with family at bedside on admission    Spiritual team is on the case    Nothing to add from cardiac stand point.              Electronically signed by James Mojica MD on 12/24/2024 at 12:52 PM

## 2024-12-24 NOTE — DISCHARGE INSTR - COC
Continuity of Care Form    Patient Name: Mark Martínez   :  1940  MRN:  [id number]    Admit date:  2024  Discharge date:  ***    Code Status Order: DNR-CC   Advance Directives:   Advance Care Flowsheet Documentation             Admitting Physician:  Akira Clay MD  PCP: Ritesh Lugo MD    Discharging Nurse: ***  Discharging Hospital Unit/Room#: 0623/0623-02  Discharging Unit Phone Number: ***    Emergency Contact:   Extended Emergency Contact Information  Primary Emergency Contact: AngelicaPreethi           [place]  Home Phone: [phone]  Mobile Phone: [phone]  Relation: Child  Secondary Emergency Contact: Toyin Martínez  Mobile Phone: [phone]  Relation: Child    Past Surgical History:  Past Surgical History:   Procedure Laterality Date    CAROTID STENT PLACEMENT  2013    CARPAL TUNNEL RELEASE Right 2018    CARPAL TUNNEL RELEASE Right 2018    RELEASE TRANSVERSE CARPAL LIGAMENT RIGHT/MEDIAN NERVE EPINEUROTOMY performed by ALTHEA Tadeo DO at [facility] OR    CORONARY ARTERY BYPASS GRAFT  2014    x6 bypass dr. olivares     ERCP N/A 2024    ENDOSCOPIC RETROGRADE CHOLANGIOPANCREATOGRAPHY SPHINCTER/PAPILLOTOMY performed by Hamlet Han MD at [facility] OR    ERCP N/A 2024    ENDOSCOPIC RETROGRADE CHOLANGIOPANCREATOGRAPHY STONE REMOVAL performed by Hamlet Han MD at [facility] OR    ERCP N/A 2024    ENDOSCOPIC RETROGRADE CHOLANGIOPANCREATOGRAPHY STENT INSERTION performed by Hamlet Han MD at [facility] OR    NECK SURGERY      cyst, benign    PACEMAKER PLACEMENT  2018    [facility]    UPPER GASTROINTESTINAL ENDOSCOPY N/A 2024    ESOPHAGOGASTRODUODENOSCOPY ENDOSCOPIC ULTRASOUND FINE NEEDLE ASPIRATION performed by Hamlet Han MD at [facility] OR       Immunization History:     There is no immunization history on file for this patient.    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) K83.1, C80.1       Isolation/Infection:   Isolation            No Isolation          Patient Infection Status       None to display            Nurse Assessment:  Last Vital Signs: BP (!) 0/0   Pulse (!) 0   Temp 97.9 °F (36.6 °C)   Resp (!) 0   Ht 1.803 m (5' 10.98\")   Wt 72.6 kg (160 lb)   SpO2 (!) 0%   BMI 22.33 kg/m²     Last documented pain score (0-10 scale): Pain Level: 0  Last Weight:   Wt Readings from Last 1 Encounters:   12/16/24 72.6 kg (160 lb)     Mental Status:  {IP PT MENTAL STATUS:20030}    IV Access:  { JOELLEN IV ACCESS:411633953}    Nursing Mobility/ADLs:  Walking   {CHP DME ADLs:929318764}  Transfer  {CHP DME ADLs:767605568}  Bathing  {CHP DME ADLs:350907444}  Dressing  {CHP DME ADLs:965606526}  Toileting  {CHP DME ADLs:625976592}  Feeding  {CHP DME ADLs:953039945}  Med Admin  {CHP DME ADLs:608192105}  Med Delivery   { JOELLEN MED Delivery:874227980}    Wound Care Documentation and Therapy:  Incision 12/27/18 Wrist Right (Active)   Number of days: 2189        Elimination:  Continence:   Bowel: {YES / NO:19727}  Bladder: {YES / NO:19727}  Urinary Catheter: {Urinary Catheter:227822031}   Colostomy/Ileostomy/Ileal Conduit: {YES / NO:19727}       Date of Last BM: ***    Intake/Output Summary (Last 24 hours) at 12/24/2024 1820  Last data filed at 12/24/2024 1257  Gross per 24 hour   Intake 60 ml   Output 300 ml   Net -240 ml     No intake/output data recorded.    Safety Concerns:     { JOELLEN Safety Concerns:403737764}    Impairments/Disabilities:      { JOELLEN Impairments/Disabilities:438124212}    Nutrition Therapy:  Current Nutrition Therapy:   { JOELLEN Diet List:149400225}    Routes of Feeding: {CHP DME Other Feedings:410433847}  Liquids: {Slp liquid

## 2024-12-24 NOTE — PROGRESS NOTES
Primary Care Physician: Ritesh Lugo MD   Admitting Physician:  Akira Clay MD  Admission date and time: 12/16/2024 11:32 PM    Room:  [address] [place] [address]  Admitting diagnosis: Symptomatic cholelithiasis [K80.20]  Common bile duct (CBD) obstruction [K83.1]    Patient Name: Mark Martínez  MRN: [id number]    Date of Service: 12/24/2024     Subjective:  Mark is a 84 y.o. male who was seen and examined today,12/24/2024, at the bedside.  Family is present at the bedside.  In the early morning, patient tried to get up by himself and had a fall because of weakness.  CT head and cervical spine was negative for any acute intracranial hemorrhage or fracture.  Although CT cervical spine showed mass/lesion on the apical lung area and further evaluation with IV contrast chest CT was recommended.  Patient's creatinine level, bilirubin Qamar, white blood count continues to trend up.  Patient's bicarb level trending low and hemoglobin is trending low.  Although stool occult is negative.  Iron panel showed anemia of chronic disease.  Patient looks very weak today and and was in very sad mood and low energy.  Family and patient together decided for hospice care.  Family changed the CODE STATUS to DNR CC.  Family does not want any further workup at this moment.  Family denied blood culture, renal ultrasound and CT chest at this moment.  They are okay with IV fluids and last dose of IV ceftriaxone.  Family stated that they are currently getting everything ready for the patient at home and will take patient home on Thursday when patient has his bed available.  Family wants patient to be comfortable at this point.      Review of System:   Constitutional:   Denies fever or chills, weight loss or gain, fatigue or malaise.positive for generalized weakness, fatigue   HEENT:   Denies ear pain, sore throat, sinus or eye problems. +Yellow sclera  Cardiovascular:   Denies any chest pain, irregular heartbeats, or palpitations.   Respiratory:  cholangiocarcinoma  -Repeat CT abdomen/pelvis done on 12/21/2024 showed Significant improvement in the previously noted intra and extrahepatic biliary ductal dilatation.  -On Cholestyramine   -GI, general surgery and heme-onc on board  -On full dose Lovenox  -Patient refused Mediport as of now  -Bilirubin trending up  -No further surgical intervention at this time; currently chemotherapy cannot be done because of the elevated LFTs  -On IV fluids    Elevated INR  Resolved with 1 dose of vitamin K 5 mg IV   we will monitor     Urinary tract infection  -Urinalysis positive for nitrites  -Urine culture positive for E. Coli  -Received ceftriaxone IV for 5 days    Acute kidney injury  Given 500 mL bolus fluid yesterday  Started on IV fluids  Family and patient refused renal ultrasound     Afib/CAD s/p CABG  Hypotension  Has a pacemaker in place  -Follows Dr. Mojica outpatient  -Cardiology on board; cleared for ERCP  -Tikosyn held due to elevated creatinine levels  -Metoprolol held due to hypotension  -500 mL bolus fluid given yesterday  -Started on IV fluids    Dark stools/anemia  -Complaints of darkening of stools at [facility]  -GI bleed vs hyperbilirubinemia  -Iron panel, vitamin B12, folate points towards anemia of chronic disease  -FOBT negative for any blood in stool  -GI on board  -Will monitor H&H     DVT prophylaxis   -On full dose Lovenox    Disposition: Hospice has been consulted and CODE STATUS has been changed to DNR CC.  [facility] Family does not want any further workup at this moment.  Family denied blood culture, renal ultrasound and CT chest at this moment.  They are okay with IV fluids and last dose of IV ceftriaxone.  Family stated that they are currently getting everything ready for the patient at home and will take patient home on Thursday when patient has his bed available.  Family wants patient to be comfortable at this point.    Continue current

## 2024-12-24 NOTE — CARE COORDINATION
12/24/2024 1145 CM Note:  Elida liaison from [facility] came and met with pt's family and they are agreeable to Hospice at Home.  Pt's daughters are working on getting the house cleaned up some and moving things around so his hospital bed can be delivered.  The family is asking if pt can be discharged Thursday to allow them time to have everything ready.  CM will follow. Electronically signed by Micheline Michelle RN on 12/24/2024 at 12:03 PM

## 2024-12-24 NOTE — PROGRESS NOTES
12/24/2024  [id number]  0623/0623-02      Patient unavailable for physical therapy treatment due to fall this morning and needing CT scan per nursing. Will attempt treatment at a later time/date.        Lynne Prado PTA  LIC# [id number]

## 2024-12-25 NOTE — SIGNIFICANT EVENT
[facility] Hospitalist    Hospitalist RRT/Code Blue Note    Subjective:    Called to bedside patient was getting up to get his urinal and he felt weak and fell backwards.  He denies any dizziness he was alert and awake and answering appropriately.  He did hit his head.  No open wounds       sodium bicarbonate  650 mg Oral BID    pantoprazole (PROTONIX) 40 mg in sodium chloride (PF) 0.9 % 10 mL injection  40 mg IntraVENous Daily    docusate  100 mg Oral Daily    cefTRIAXone (ROCEPHIN) IV  1,000 mg IntraVENous Q24H    enoxaparin  1 mg/kg SubCUTAneous BID    sodium chloride flush  5-40 mL IntraVENous 2 times per day     diphenhydrAMINE, 25 mg, Q6H PRN  hydrOXYzine HCl, 25 mg, Q6H PRN  sodium chloride flush, 5-40 mL, PRN  sodium chloride, , PRN  potassium chloride, 40 mEq, PRN   Or  potassium alternative oral replacement, 40 mEq, PRN   Or  potassium chloride, 10 mEq, PRN  magnesium sulfate, 2,000 mg, PRN  ondansetron, 4 mg, Q8H PRN  polyethylene glycol, 17 g, Daily PRN  acetaminophen, 650 mg, Q6H PRN   Or  acetaminophen, 650 mg, Q6H PRN  morphine, 2 mg, Q4H PRN         Objective:    BP (!) 0/0   Pulse (!) 0   Temp 97.9 °F (36.6 °C)   Resp (!) 0   Ht 1.803 m (5' 10.98\")   Wt 72.6 kg (160 lb)   SpO2 (!) 0%   BMI 22.33 kg/m²   General Appearance: alert and oriented to person, place and time, patient was laying on the ground on his back when I saw him.  Was transferred back to the bed after exam.  Skin: warm and dry, no rash or erythema , severely jaundiced  Head: normocephalic and atraumatic  Eyes: pupils equal, round, and reactive to light, extraocular eye movements intact, conjunctivae severely jaundiced  Pulmonary/Chest: clear to auscultation bilaterally- no wheezes, rales or rhonchi, normal air movement, no respiratory distress  Cardiovascular: normal rate, regular rhythm, normal S1 and S2, systolic murmur over base and apex   Abdomen: soft, non-tender, non-distended, normal bowel sounds, no  made to ensure accuracy; however, inadvertent computerized transcription errors may be present.     Electronically signed by Ajith Almanza MD on 12/24/2024 at 7:09 PM

## 2024-12-25 NOTE — DISCHARGE SUMMARY
Internal Medicine  Discharge Summary    NAME: Mark Martínez  :  1940  MRN:  [id number]  PCP:Ritesh Lugo MD  ADMITTED: 2024      DISCHARGED: 24    ADMITTING PHYSICIAN: No att. providers found Dr. 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  Cholelithiasis  Dark stools/anemia  Anemia of chronic disease  Urinary tract infection  Acute kidney injury  Metabolic acidosis  Hypertension  Elevated INR  History of A fib  History of colon cancer s/p sigmoidectomy/high anterior resection 11/10/20 ([facility]), pathology consistent with invasive moderately differentiated colonic adenocarcinoma with no positive lymph nodes  History of prostate cancer treated with radiation and surgery  History of basal cell carcinoma of scalp s/p excision  History of CAD s/p CABG x6, pacemaker placement ()     DISCHARGE DIAGNOSES:   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  He underwent abdominal CT scan outpatient which showed metastatic  s/p sigmoidectomy/high anterior resection 11/10/20 ([facility]), pathology consistent with invasive moderately differentiated colonic adenocarcinoma with no positive lymph nodes,  prostate cancer treated with radiation and surgery, basal cell carcinoma of scalp s/p excision, CAD s/p CABG x6, pacemaker placement (2021) on Eliquis who is admitted under the diagnosis of new onset worsening jaundice, presyncope and weakness at [facility].  He underwent abdominal CT scan outpatient which showed metastatic lesions in the liver, stomach, lungs.  He was scheduled for hepatic biopsy next week at [facility].  He endorsed a lot of itching on his skin and had multiple excoriations due to itching.  MRCP on 12/16/2024 done at [facility] showed findings of common bile duct obstruction likely due to extrinsic compression from local regional metastases, large infiltrating mass of the left hepatic lobe favoring intrahepatic cholangiocarcinoma.  Decision was made to transfer the patient to [facility] for plan of ERCP. patient stated that he did not followed up with anyone after his sigmoidectomy.  He also did not follow-up with any further colonoscopy in the past.  As per patient's daughter, sigmoidectomy surgery was too much for the patient so he refused to follow-up further with anyone.  Patient was on heparin drip at [facility] for history of atrial fibrillation and plan for any surgery needed. Patient was admitted at [facility] for ERCP and possible liver biopsy.  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   Gallbladder mildly distended.  -MRCP on 2024 showed findings of common bile duct obstruction likely due to extrinsic compression from local regional metastases, large infiltrating mass of the left hepatic lobe favoring intrahepatic cholangiocarcinoma.  -CT abdomen pelvis done on 2024 showed  2.4 cm hypodense mass in the region of the pancreatic neck likely reflecting primary pancreatic neoplasm. Hepatic metastatic disease. Metastatic peripancreatic and periportal lymph nodes. Abdominal and pelvic ascites. Cholelithiasis.  -Total bilirubin and direct bilirubin elevated  -ERCP with EUS biopsy of liver mass and pancreatic mass was done on 2024; pointing towards cholangiocarcinoma  -Repeat CT abdomen/pelvis done on 2024 showed Significant improvement in the previously noted intra and extrahepatic biliary ductal dilatation.  -GI, general surgery and heme-onc on board  -Patient refused Mediport  -Bilirubin trended up  -No further surgical intervention at this time; currently chemotherapy cannot be done because of the elevated LFTs  -Patient was on IV fluids     Elevated INR  Resolved with 1 dose of vitamin K 5 mg IV      Urinary tract infection  -Urinalysis positive for nitrites  -Urine culture positive for E. Coli  -Received ceftriaxone IV for 5 days        BRIEF PHYSICAL EXAMINATION AND LABORATORIES ON DAY OF DISCHARGE:  Please refer to death documentation       DISPOSITION:  The patient' was pronounced  due to cardiopulmonary arrest secondary to CBD obstruction secondary to compressive metastasis due to unknown primary on 2024 at 18:01.    Preparing for this patient's discharge, including paperwork, orders, instructions, and meeting with patient did require > 40 minutes.    Silvia Packer MD     2024  2:41 PM

## 2024-12-26 LAB — SURGICAL PATHOLOGY REPORT: NORMAL

## 2024-12-31 ENCOUNTER — TELEPHONE (OUTPATIENT)
Dept: HEMATOLOGY | Age: 84
End: 2024-12-31

## 2024-12-31 NOTE — TELEPHONE ENCOUNTER
Reason for being in patients chart was to confirm that the patient was . We received a new patient referral from Dr Ana Ramos, but her office was not able to confirm this.   Electronically signed by Danay Jones MA on 2024 at 9:23 AM

## (undated) PROCEDURE — 0FB04ZX EXCISION OF LIVER, PERCUTANEOUS ENDOSCOPIC APPROACH, DIAGNOSTIC: ICD-10-PCS

## (undated) DEVICE — BANDAGE COMPR W4XL108IN WHT LAYERED NO CLSR SYN RUB ESMARCH

## (undated) DEVICE — NEEDLE HYPO 25GA L1.5IN BLU POLYPR HUB S STL REG BVL STR

## (undated) DEVICE — TUBING, SUCTION, 1/4" X 10', STRAIGHT: Brand: MEDLINE

## (undated) DEVICE — BAG SPECIMEN BIOHAZARD 6IN X 9IN

## (undated) DEVICE — ELECTRODE PT RET AD L9FT HI MOIST COND ADH HYDRGEL CORDED

## (undated) DEVICE — 1810 FOAM BLOCK NEEDLE COUNTER: Brand: DEVON

## (undated) DEVICE — SYSTEM BX CAP BILI RAP EXCHG CAP LOK DEV COMPATIBLE W/ OLY

## (undated) DEVICE — ELECTRODE NDL 2.8IN COAT VALLEYLAB

## (undated) DEVICE — SPHINCTEROTOME: Brand: HYDRATOME RX 44

## (undated) DEVICE — SOLUTION SURG PREP POV IOD 7.5% 4 OZ

## (undated) DEVICE — ENCORE® LATEX TEXTURED SIZE 8, STERILE LATEX POWDER-FREE SURGICAL GLOVE: Brand: ENCORE

## (undated) DEVICE — GAUZE,SPONGE,4"X4",16PLY,XRAY,STRL,LF: Brand: MEDLINE

## (undated) DEVICE — WIPES SKIN CLOTH READYPREP 9 X 10.5 IN 2% CHLORHEX GLUCONATE CHG PREOP

## (undated) DEVICE — SOLUTION IV IRRIG POUR BRL 0.9% SODIUM CHL 2F7124

## (undated) DEVICE — PENCIL ES L3M BTTN SWCH HOLSTER W/ BLDE ELECTRD EDGE

## (undated) DEVICE — SINGLE USE DISTAL COVER MAJ-2315: Brand: SINGLE USE DISTAL COVER

## (undated) DEVICE — 20 ML SYRINGE REGULAR TIP: Brand: MONOJECT

## (undated) DEVICE — 4-PORT MANIFOLD: Brand: NEPTUNE 2

## (undated) DEVICE — KIT BEDSIDE REVITAL OX 500ML

## (undated) DEVICE — BLOCK BITE 60FR CAREGUARD

## (undated) DEVICE — Device: Brand: DEFENDO VALVE AND CONNECTOR KIT

## (undated) DEVICE — SOLUTION IRRIG 1000ML 09% SOD CHL USP PIC PLAS CONTAINER

## (undated) DEVICE — GOWN SURG XL SMS FAB NONREINFORCED RAGLAN SLV HK LOOP CLSR

## (undated) DEVICE — SPONGE GZ 4IN 4IN 4 PLY N WVN AVANT

## (undated) DEVICE — AIR/WATER CLEANING ADAPTER FOR OLYMPUS® GI ENDOSCOPE: Brand: BULLDOG®

## (undated) DEVICE — FORCEPS BX L240CM JAW DIA2.8MM L CAP W/ NDL MIC MESH TOOTH

## (undated) DEVICE — HOOK LOCK LATEX FREE ELASTIC BANDAGE 3INX5YD

## (undated) DEVICE — GAUZE,SPONGE,4"X4",12PLY,STERILE,LF,2'S: Brand: MEDLINE

## (undated) DEVICE — RETRIEVAL BALLOON CATHETER: Brand: EXTRACTOR™ PRO RX

## (undated) DEVICE — SYSTEM INJ BILI RAP REFIL CONT

## (undated) DEVICE — ENDOSCOPIC ULTRASOUND FINE NEEDLE BIOPSY (FNB) DEVICE: Brand: ACQUIRE

## (undated) DEVICE — KENDALL 450 SERIES MONITORING FOAM ELECTRODE - RECTANGULAR SHAPE ( 3/PK): Brand: KENDALL

## (undated) DEVICE — BASIC PACK: Brand: CONVERTORS

## (undated) DEVICE — TOWEL OR BLUEE 16X26IN ST 8 PACK ORB08 16X26ORTWL

## (undated) DEVICE — PEN: MARKING STD 100/CS: Brand: MEDICAL ACTION INDUSTRIES

## (undated) DEVICE — DRESSING GZ XRFRM 4X4(25/BX 6BX/CS)

## (undated) DEVICE — YANKAUER,BULB TIP,W/O VENT,RIGID,STERILE: Brand: MEDLINE

## (undated) DEVICE — VALVE SUCTION AIR H2O HYDR H2O JET CONN STRL ORCA POD + DISP

## (undated) DEVICE — Device: Brand: BALLOON3

## (undated) DEVICE — INTENDED FOR TISSUE SEPARATION, AND OTHER PROCEDURES THAT REQUIRE A SHARP SURGICAL BLADE TO PUNCTURE OR CUT.: Brand: BARD-PARKER ® STAINLESS STEEL BLADES

## (undated) DEVICE — CONTAINER SPEC COLL 960ML POLYPR TRIANG GRAD INTAKE/OUTPUT

## (undated) DEVICE — JELLY,LUBE,STERILE,FLIP TOP,TUBE,4-OZ: Brand: MEDLINE

## (undated) DEVICE — SUTURE PROL SZ 6-0 L18IN NONABSORBABLE BLU L16MM PS-3 3/8 8680G

## (undated) DEVICE — HANDLE CVR PATENTED RETENTION DISC STRL LIGHT SHLD

## (undated) DEVICE — PADDING CAST 4 YDX3 IN COTTON NS WBRL

## (undated) DEVICE — GLOVE SURG 7 LTX TRIFLEX WIDE FINGER PWDR